# Patient Record
Sex: FEMALE | Race: WHITE | Employment: FULL TIME | URBAN - METROPOLITAN AREA
[De-identification: names, ages, dates, MRNs, and addresses within clinical notes are randomized per-mention and may not be internally consistent; named-entity substitution may affect disease eponyms.]

---

## 2020-01-13 ENCOUNTER — TELEPHONE (OUTPATIENT)
Dept: PULMONOLOGY | Age: 55
End: 2020-01-13

## 2020-01-22 ENCOUNTER — HOSPITAL ENCOUNTER (OUTPATIENT)
Age: 55
Discharge: HOME | End: 2020-01-22
Payer: COMMERCIAL

## 2020-01-22 ENCOUNTER — TELEPHONE (OUTPATIENT)
Dept: PULMONOLOGY | Age: 55
End: 2020-01-22

## 2020-01-22 ENCOUNTER — HOSPITAL ENCOUNTER (OUTPATIENT)
Dept: CT IMAGING | Age: 55
Discharge: HOME | End: 2020-01-22
Payer: COMMERCIAL

## 2020-01-22 ENCOUNTER — OFFICE VISIT (OUTPATIENT)
Dept: PULMONOLOGY | Age: 55
End: 2020-01-22
Payer: COMMERCIAL

## 2020-01-22 VITALS
RESPIRATION RATE: 16 BRPM | WEIGHT: 167.6 LBS | DIASTOLIC BLOOD PRESSURE: 60 MMHG | BODY MASS INDEX: 27.92 KG/M2 | HEART RATE: 86 BPM | OXYGEN SATURATION: 97 % | HEIGHT: 65 IN | TEMPERATURE: 97.8 F | SYSTOLIC BLOOD PRESSURE: 106 MMHG

## 2020-01-22 PROCEDURE — 99244 OFF/OP CNSLTJ NEW/EST MOD 40: CPT | Performed by: INTERNAL MEDICINE

## 2020-01-22 PROCEDURE — 71250 CT THORAX DX C-: CPT

## 2020-01-22 PROCEDURE — G8427 DOCREV CUR MEDS BY ELIG CLIN: HCPCS | Performed by: INTERNAL MEDICINE

## 2020-01-22 PROCEDURE — G8484 FLU IMMUNIZE NO ADMIN: HCPCS | Performed by: INTERNAL MEDICINE

## 2020-01-22 PROCEDURE — G8419 CALC BMI OUT NRM PARAM NOF/U: HCPCS | Performed by: INTERNAL MEDICINE

## 2020-01-22 PROCEDURE — 83520 IMMUNOASSAY QUANT NOS NONAB: CPT

## 2020-01-22 RX ORDER — BUPROPION HYDROCHLORIDE 75 MG/1
150 TABLET ORAL DAILY
Status: ON HOLD | COMMUNITY
End: 2021-06-30 | Stop reason: CLARIF

## 2020-01-22 RX ORDER — ALBUTEROL SULFATE 90 UG/1
2 AEROSOL, METERED RESPIRATORY (INHALATION) EVERY 6 HOURS PRN
Qty: 1 INHALER | Refills: 6 | Status: SHIPPED | OUTPATIENT
Start: 2020-01-22 | End: 2020-08-25 | Stop reason: SDUPTHER

## 2020-01-22 NOTE — COMMUNICATION BODY
Assessment:       · RLL 1.2 cm lung nodule on CT 1/22/2020. DDx granuloma and malignancy    · Cystic lung disease.  DDx [facility] and [facility]  · Chronic Cough-probable underlying COPD  · 35-pack-year history of smoking-quit 2 weeks ago      Plan:       · PFTs and 6MW  · PET scan-resection evaluation if nodule is hypermetabolic with negative mediastinum  · Check vascular endothelial growth factor-D  · Continue albuterol 2 puffs Q4-6 hrs PRN  · Advised to continue with smoking cessation

## 2020-01-22 NOTE — TELEPHONE ENCOUNTER
Pt is scheduled for PET ct on 1/31/2020. F/u is 2/4/2020.    1/22/2020    Assessment:       · RLL 1.2 cm lung nodule on CT 1/22/2020. DDx granuloma and malignancy    · Cystic lung disease.  DDx [facility] and [facility]  · Chronic Cough-probable underlying COPD  · 35-pack-year history of smoking-quit 2 weeks ago      Plan:       · PFTs and 6MW  · PET scan-resection evaluation if nodule is hypermetabolic with negative mediastinum  · Check vascular endothelial growth factor-D  · Continue albuterol 2 puffs Q4-6 hrs PRN  · Advised to continue with smoking cessation

## 2020-01-22 NOTE — PROGRESS NOTES
[facility]                                                                    CHIEF COMPLAINT: Pulmonary nodules    Consulting provider: JACKSON Donohue - CNP      HPI:   Patient was seen jan 5 for bronchitis and had CXR that showed RLL nodule. I did obtain CT scan, CT done 1/22/2020 imaging reviewed by me and showed small right lower lobe 1.2 cm nodule. Associated with multiple cysts in the lung bilaterally. Not sure what makes better or worse. No history of lung nodules. No weight loss. Smoker for 35 years 1 ppd. No history of PNA. No IBD. Chronic cough for few years. Little cough. Denies any SOB or wheezes. Not needing to use her Albuterol. Old records were reviewed and summarized by me. Past Medical History:   Diagnosis Date    Anemia        Past Surgical History:        Procedure Laterality Date    HERNIA REPAIR         Allergies:  is allergic to codeine; percocet [oxycodone-acetaminophen]; and vicodin [hydrocodone-acetaminophen]. Social History:    TOBACCO:   reports that she quit smoking about 3 weeks ago. Her smoking use included cigarettes. She has a 35.00 pack-year smoking history. She does not have any smokeless tobacco history on file. ETOH:   reports current alcohol use.       Family History:   No lung cancer       Current Medications:    Current Outpatient Medications:     buPROPion (WELLBUTRIN) 75 MG tablet, Take 75 mg by mouth daily, Disp: , Rfl:       REVIEW OF SYSTEMS:  Constitutional: Negative for fever  HENT: Negative for sore throat  Eyes: Negative for redness   Respiratory: Negative for dyspnea, cough  Cardiovascular: Negative for chest pain  Gastrointestinal: Negative for vomiting, diarrhea   Genitourinary: Negative for hematuria   Musculoskeletal: Negative for arthralgias   Skin: Negative for rash  Neurological: Negative for syncope  Hematological: Negative for adenopathy  Psychiatric/Behavorial: negative mediastinum  · Check vascular endothelial growth factor-D  · Continue albuterol 2 puffs Q4-6 hrs PRN  · Advised to continue with smoking cessation

## 2020-01-27 ENCOUNTER — HOSPITAL ENCOUNTER (OUTPATIENT)
Dept: PULMONOLOGY | Age: 55
Discharge: HOME | End: 2020-01-27
Payer: COMMERCIAL

## 2020-01-27 LAB
DLCO %PRED: 74 %
DLCO PRED: NORMAL
DLCO/VA %PRED: NORMAL
DLCO/VA PRED: NORMAL
DLCO/VA: NORMAL
DLCO: NORMAL
EXPIRATORY TIME-POST: NORMAL
EXPIRATORY TIME: NORMAL
FEF 25-75% %CHNG: NORMAL
FEF 25-75% %PRED-POST: NORMAL
FEF 25-75% %PRED-PRE: NORMAL
FEF 25-75% PRED: NORMAL
FEF 25-75%-POST: NORMAL
FEF 25-75%-PRE: NORMAL
FEV1 %PRED-POST: 95 %
FEV1 %PRED-PRE: 90 %
FEV1 PRED: NORMAL
FEV1-POST: NORMAL
FEV1-PRE: NORMAL
FEV1/FVC %PRED-POST: NORMAL
FEV1/FVC %PRED-PRE: NORMAL
FEV1/FVC PRED: NORMAL
FEV1/FVC-POST: 70 %
FEV1/FVC-PRE: 69 %
FVC %PRED-POST: NORMAL
FVC %PRED-PRE: NORMAL
FVC PRED: NORMAL
FVC-POST: NORMAL
FVC-PRE: NORMAL
GAW %PRED: NORMAL
GAW PRED: NORMAL
GAW: NORMAL
IC %PRED: NORMAL
IC PRED: NORMAL
IC: NORMAL
MEP: NORMAL
MIP: NORMAL
MVV %PRED-PRE: NORMAL
MVV PRED: NORMAL
MVV-PRE: NORMAL
PEF %PRED-POST: NORMAL
PEF %PRED-PRE: NORMAL
PEF PRED: NORMAL
PEF%CHNG: NORMAL
PEF-POST: NORMAL
PEF-PRE: NORMAL
RAW %PRED: NORMAL
RAW PRED: NORMAL
RAW: NORMAL
RV %PRED: NORMAL
RV PRED: NORMAL
RV: NORMAL
SVC %PRED: NORMAL
SVC PRED: NORMAL
SVC: NORMAL
TLC %PRED: 105 %
TLC PRED: NORMAL
TLC: NORMAL
VA %PRED: NORMAL
VA PRED: NORMAL
VA: NORMAL
VTG %PRED: NORMAL
VTG PRED: NORMAL
VTG: NORMAL

## 2020-01-27 PROCEDURE — 94060 EVALUATION OF WHEEZING: CPT

## 2020-01-27 PROCEDURE — 6360000002 HC RX W HCPCS: Performed by: INTERNAL MEDICINE

## 2020-01-27 PROCEDURE — 94618 PULMONARY STRESS TESTING: CPT

## 2020-01-27 PROCEDURE — 94729 DIFFUSING CAPACITY: CPT

## 2020-01-27 PROCEDURE — 94640 AIRWAY INHALATION TREATMENT: CPT

## 2020-01-27 PROCEDURE — 94726 PLETHYSMOGRAPHY LUNG VOLUMES: CPT

## 2020-01-27 RX ORDER — ALBUTEROL SULFATE 2.5 MG/3ML
2.5 SOLUTION RESPIRATORY (INHALATION) ONCE
Status: COMPLETED | OUTPATIENT
Start: 2020-01-27 | End: 2020-01-27

## 2020-01-27 RX ADMIN — ALBUTEROL SULFATE 2.5 MG: 2.5 SOLUTION RESPIRATORY (INHALATION) at 08:55

## 2020-01-27 ASSESSMENT — PULMONARY FUNCTION TESTS
FEV1/FVC_PRE: 69
FEV1_PERCENT_PREDICTED_PRE: 90
FEV1_PERCENT_PREDICTED_POST: 95
FEV1/FVC_POST: 70

## 2020-01-29 LAB — MISCELLANEOUS LAB TEST ORDER: NORMAL

## 2020-01-31 ENCOUNTER — HOSPITAL ENCOUNTER (OUTPATIENT)
Dept: PET IMAGING | Age: 55
Discharge: HOME | End: 2020-01-31
Payer: COMMERCIAL

## 2020-01-31 VITALS — BODY MASS INDEX: 27.82 KG/M2 | WEIGHT: 167 LBS | HEIGHT: 65 IN

## 2020-01-31 PROCEDURE — 3430000000 HC RX DIAGNOSTIC RADIOPHARMACEUTICAL: Performed by: INTERNAL MEDICINE

## 2020-01-31 PROCEDURE — A9552 F18 FDG: HCPCS | Performed by: INTERNAL MEDICINE

## 2020-01-31 PROCEDURE — 78815 PET IMAGE W/CT SKULL-THIGH: CPT

## 2020-01-31 RX ORDER — FLUDEOXYGLUCOSE F 18 200 MCI/ML
16.9 INJECTION, SOLUTION INTRAVENOUS
Status: COMPLETED | OUTPATIENT
Start: 2020-01-31 | End: 2020-01-31

## 2020-01-31 RX ADMIN — FLUDEOXYGLUCOSE F 18 16.9 MILLICURIE: 200 INJECTION, SOLUTION INTRAVENOUS at 10:30

## 2020-02-04 ENCOUNTER — OFFICE VISIT (OUTPATIENT)
Dept: PULMONOLOGY | Age: 55
End: 2020-02-04
Payer: COMMERCIAL

## 2020-02-04 ENCOUNTER — TELEPHONE (OUTPATIENT)
Dept: PULMONOLOGY | Age: 55
End: 2020-02-04

## 2020-02-04 ENCOUNTER — HOSPITAL ENCOUNTER (EMERGENCY)
Age: 55
Discharge: HOME | End: 2020-02-04
Attending: EMERGENCY MEDICINE
Payer: COMMERCIAL

## 2020-02-04 ENCOUNTER — APPOINTMENT (OUTPATIENT)
Dept: GENERAL RADIOLOGY | Age: 55
End: 2020-02-04
Payer: COMMERCIAL

## 2020-02-04 VITALS — OXYGEN SATURATION: 98 % | RESPIRATION RATE: 16 BRPM | HEIGHT: 65 IN | BODY MASS INDEX: 27.82 KG/M2 | WEIGHT: 167 LBS

## 2020-02-04 VITALS
BODY MASS INDEX: 27.82 KG/M2 | WEIGHT: 167 LBS | HEART RATE: 78 BPM | RESPIRATION RATE: 10 BRPM | OXYGEN SATURATION: 97 % | HEIGHT: 65 IN | DIASTOLIC BLOOD PRESSURE: 73 MMHG | SYSTOLIC BLOOD PRESSURE: 109 MMHG | TEMPERATURE: 97.8 F

## 2020-02-04 LAB
A/G RATIO: 1.3 (ref 1.1–2.2)
ALBUMIN SERPL-MCNC: 4.4 G/DL (ref 3.4–5)
ALP BLD-CCNC: 49 U/L (ref 40–129)
ALT SERPL-CCNC: 18 U/L (ref 10–40)
ANION GAP SERPL CALCULATED.3IONS-SCNC: 19 MMOL/L (ref 3–16)
AST SERPL-CCNC: 18 U/L (ref 15–37)
BASOPHILS ABSOLUTE: 0.1 K/UL (ref 0–0.2)
BASOPHILS RELATIVE PERCENT: 0.7 %
BILIRUB SERPL-MCNC: <0.2 MG/DL (ref 0–1)
BUN BLDV-MCNC: 10 MG/DL (ref 7–20)
CALCIUM SERPL-MCNC: 10.1 MG/DL (ref 8.3–10.6)
CHLORIDE BLD-SCNC: 99 MMOL/L (ref 99–110)
CO2: 20 MMOL/L (ref 21–32)
CREAT SERPL-MCNC: 0.8 MG/DL (ref 0.6–1.1)
D DIMER: <200 NG/ML DDU (ref 0–229)
EKG ATRIAL RATE: 78 BPM
EKG DIAGNOSIS: NORMAL
EKG P AXIS: 54 DEGREES
EKG P-R INTERVAL: 132 MS
EKG Q-T INTERVAL: 404 MS
EKG QRS DURATION: 76 MS
EKG QTC CALCULATION (BAZETT): 460 MS
EKG R AXIS: 40 DEGREES
EKG T AXIS: 52 DEGREES
EKG VENTRICULAR RATE: 78 BPM
EOSINOPHILS ABSOLUTE: 0.1 K/UL (ref 0–0.6)
EOSINOPHILS RELATIVE PERCENT: 1.5 %
GFR AFRICAN AMERICAN: >60
GFR NON-AFRICAN AMERICAN: >60
GLOBULIN: 3.3 G/DL
GLUCOSE BLD-MCNC: 105 MG/DL (ref 70–99)
HCT VFR BLD CALC: 44.5 % (ref 36–48)
HEMOGLOBIN: 14.6 G/DL (ref 12–16)
LYMPHOCYTES ABSOLUTE: 3.2 K/UL (ref 1–5.1)
LYMPHOCYTES RELATIVE PERCENT: 45.9 %
MCH RBC QN AUTO: 29.4 PG (ref 26–34)
MCHC RBC AUTO-ENTMCNC: 32.8 G/DL (ref 31–36)
MCV RBC AUTO: 89.8 FL (ref 80–100)
MONOCYTES ABSOLUTE: 0.5 K/UL (ref 0–1.3)
MONOCYTES RELATIVE PERCENT: 7.2 %
NEUTROPHILS ABSOLUTE: 3.1 K/UL (ref 1.7–7.7)
NEUTROPHILS RELATIVE PERCENT: 44.7 %
PDW BLD-RTO: 14.3 % (ref 12.4–15.4)
PLATELET # BLD: 304 K/UL (ref 135–450)
PMV BLD AUTO: 8.1 FL (ref 5–10.5)
POTASSIUM REFLEX MAGNESIUM: 4 MMOL/L (ref 3.5–5.1)
RBC # BLD: 4.95 M/UL (ref 4–5.2)
SODIUM BLD-SCNC: 138 MMOL/L (ref 136–145)
TOTAL PROTEIN: 7.7 G/DL (ref 6.4–8.2)
TROPONIN: <0.01 NG/ML
TROPONIN: <0.01 NG/ML
WBC # BLD: 7 K/UL (ref 4–11)

## 2020-02-04 PROCEDURE — 96374 THER/PROPH/DIAG INJ IV PUSH: CPT

## 2020-02-04 PROCEDURE — G8427 DOCREV CUR MEDS BY ELIG CLIN: HCPCS | Performed by: INTERNAL MEDICINE

## 2020-02-04 PROCEDURE — 93005 ELECTROCARDIOGRAM TRACING: CPT | Performed by: EMERGENCY MEDICINE

## 2020-02-04 PROCEDURE — G8484 FLU IMMUNIZE NO ADMIN: HCPCS | Performed by: INTERNAL MEDICINE

## 2020-02-04 PROCEDURE — 6370000000 HC RX 637 (ALT 250 FOR IP): Performed by: EMERGENCY MEDICINE

## 2020-02-04 PROCEDURE — 1036F TOBACCO NON-USER: CPT | Performed by: INTERNAL MEDICINE

## 2020-02-04 PROCEDURE — 80053 COMPREHEN METABOLIC PANEL: CPT

## 2020-02-04 PROCEDURE — 84484 ASSAY OF TROPONIN QUANT: CPT

## 2020-02-04 PROCEDURE — 3017F COLORECTAL CA SCREEN DOC REV: CPT | Performed by: INTERNAL MEDICINE

## 2020-02-04 PROCEDURE — 85025 COMPLETE CBC W/AUTO DIFF WBC: CPT

## 2020-02-04 PROCEDURE — 93010 ELECTROCARDIOGRAM REPORT: CPT | Performed by: INTERNAL MEDICINE

## 2020-02-04 PROCEDURE — 99285 EMERGENCY DEPT VISIT HI MDM: CPT

## 2020-02-04 PROCEDURE — 6360000002 HC RX W HCPCS: Performed by: EMERGENCY MEDICINE

## 2020-02-04 PROCEDURE — G8419 CALC BMI OUT NRM PARAM NOF/U: HCPCS | Performed by: INTERNAL MEDICINE

## 2020-02-04 PROCEDURE — 85379 FIBRIN DEGRADATION QUANT: CPT

## 2020-02-04 PROCEDURE — 99214 OFFICE O/P EST MOD 30 MIN: CPT | Performed by: INTERNAL MEDICINE

## 2020-02-04 PROCEDURE — 71046 X-RAY EXAM CHEST 2 VIEWS: CPT

## 2020-02-04 RX ORDER — LORAZEPAM 2 MG/ML
1 INJECTION INTRAMUSCULAR ONCE
Status: COMPLETED | OUTPATIENT
Start: 2020-02-04 | End: 2020-02-04

## 2020-02-04 RX ORDER — PANTOPRAZOLE SODIUM 40 MG/1
40 TABLET, DELAYED RELEASE ORAL DAILY
COMMUNITY
End: 2020-06-15 | Stop reason: ALTCHOICE

## 2020-02-04 RX ORDER — ASPIRIN 81 MG/1
324 TABLET, CHEWABLE ORAL ONCE
Status: COMPLETED | OUTPATIENT
Start: 2020-02-04 | End: 2020-02-04

## 2020-02-04 RX ORDER — CETIRIZINE HYDROCHLORIDE 10 MG/1
10 TABLET ORAL DAILY
COMMUNITY
End: 2021-08-19 | Stop reason: ALTCHOICE

## 2020-02-04 RX ADMIN — LORAZEPAM 1 MG: 2 INJECTION INTRAMUSCULAR; INTRAVENOUS at 14:34

## 2020-02-04 RX ADMIN — ASPIRIN 324 MG: 81 TABLET, CHEWABLE ORAL at 14:34

## 2020-02-04 ASSESSMENT — PAIN DESCRIPTION - PAIN TYPE: TYPE: ACUTE PAIN

## 2020-02-04 ASSESSMENT — PAIN SCALES - GENERAL
PAINLEVEL_OUTOF10: 5
PAINLEVEL_OUTOF10: 3

## 2020-02-04 ASSESSMENT — PAIN DESCRIPTION - FREQUENCY: FREQUENCY: CONTINUOUS

## 2020-02-04 ASSESSMENT — PAIN DESCRIPTION - DESCRIPTORS: DESCRIPTORS: ACHING;SHARP

## 2020-02-04 ASSESSMENT — PAIN DESCRIPTION - LOCATION: LOCATION: CHEST

## 2020-02-04 NOTE — PROGRESS NOTES
[facility]                                                                    CHIEF COMPLAINT: Follow-up PET scan        HPI:   PET scan reviewed by me and noted below. Results were discussed with patient. Patient started with chest cramp this a.m. then while driving started having tingling numbness on the left side. Tachypneic tachycardic in office saying I am having a panic attack. Patient was placed on a wheelchair and taken down to ED. From prior visit:   Patient was seen jan 5 for bronchitis and had CXR that showed RLL nodule. I did obtain CT scan, CT done 1/22/2020 imaging reviewed by me and showed small right lower lobe 1.2 cm nodule. Associated with multiple cysts in the lung bilaterally. Not sure what makes better or worse. No history of lung nodules. No weight loss. Smoker for 35 years 1 ppd. No history of PNA. No IBD. Chronic cough for few years. Little cough. Denies any SOB or wheezes. Not needing to use her Albuterol. Old records were reviewed and summarized by me. Past Medical History:   Diagnosis Date    Anemia     Hiatal hernia     Migraines        Past Surgical History:        Procedure Laterality Date    HERNIA REPAIR         Allergies:  is allergic to codeine; percocet [oxycodone-acetaminophen]; and vicodin [hydrocodone-acetaminophen]. Social History:    TOBACCO:   reports that she quit smoking about 4 weeks ago. Her smoking use included cigarettes. She has a 35.00 pack-year smoking history. She has never used smokeless tobacco.  ETOH:   reports current alcohol use.       Family History:   No lung cancer       Current Medications:    Current Outpatient Medications:     pantoprazole (PROTONIX) 40 MG tablet, Take 40 mg by mouth daily, Disp: , Rfl:     cetirizine (ZYRTEC) 10 MG tablet, Take 10 mg by mouth daily, Disp: , Rfl:     Acetaminophen (TYLENOL EXTRA STRENGTH PO), Take by mouth, Disp: , right lower lobe nodule, SUV 2.95    Vascular endothelial G factor 49    Assessment:       · Left-sided tingling, numbness, and weakness -rule out acute CVA  · RLL 1.2 cm lung nodule on CT 1/22/2020. Mild activity on PET scan 1/31/2020 with SUV of 2.95. Granuloma versus malignancy  · Cystic lung disease. DDx [facility] and [facility].  Vascular endothelial growth factor D  · Chronic Cough-probable underlying COPD  · Mild COPD  · 35-pack-year history of smoking-quit 2 weeks ago      Plan:       · Patient was emergently wheeled down to ED for acute CVA evaluation   · Discussed with ED physician  · Options of Bx, resection and watchful waiting were discussed with patient/family. Patient is in favor of resection.   We will make a referral.  · Continue albuterol 2 puffs Q4-6 hrs PRN  · Advised to continue with smoking cessation

## 2020-02-04 NOTE — TELEPHONE ENCOUNTER
Pt needs referred to Dr Merly Jimenez. F/u with Dr Charles Schwartz needed also as pt was taken to ER during visit    2/4/2020    Assessment:       · Left-sided tingling, numbness, and weakness -rule out acute CVA  · RLL 1.2 cm lung nodule on CT 1/22/2020. Mild activity on PET scan 1/31/2020 with SUV of 2.95. Granuloma versus malignancy  · Cystic lung disease. DDx [facility] and [facility].  Vascular endothelial growth factor D  · Chronic Cough-probable underlying COPD  · Mild COPD  · 35-pack-year history of smoking-quit 2 weeks ago      Plan:       · Patient was emergently wheeled down to ED for acute CVA evaluation   · Discussed with ED physician  · Options of Bx, resection and watchful waiting were discussed with patient/family. Patient is in favor of resection.   We will make a referral.  · Continue albuterol 2 puffs Q4-6 hrs PRN  · Advised to continue with smoking cessation

## 2020-02-04 NOTE — ED PROVIDER NOTES
1/26/2020to be communicated to a licensed caregiver. Pet Ct Skull Base To Mid Thigh    Result Date: 1/31/2020  EXAMINATION: WHOLE BODY PET/CT 1/31/2020 TECHNIQUE: Following IV injection of 16.9 mCi of F 18 -FDG, PET  tumor imaging was acquired from the base of the skull to the mid thighs. Computed tomography was used for purposes of attenuation correction and anatomic localization. Fusion imaging was utilized for interpretation. Uptake time 55 mins. Glucose level 95 mg/dl. COMPARISON: Chest CT 01/22/2020 HISTORY: ORDERING SYSTEM PROVIDED HISTORY: Right lower lobe pulmonary nodule TECHNOLOGIST PROVIDED HISTORY: Reason for exam:->see list Is the patient pregnant?->No FINDINGS: No hypermetabolic adenopathy within the neck. There is redemonstration of the previously described pulmonary nodule in the right lower lobe. This is smoothly marginated. This measures 1.2 x 1.1 cm. .. This is hypermetabolic, maximum SUV measuring 3.05 No hypermetabolic pulmonary nodules on the left. No hypermetabolic mediastinal adenopathy. Postsurgical changes seen at the GE junction. No hypermetabolic adrenal nodules are noted. Mild stool load seen in the colon. No free fluid in pelvis. No pelvic adenopathy. Spurring is seen in the spine     Right lower lobe pulmonary nodule seen on recent CT scan is mildly hypermetabolic. This is low-density but not in a typical location for a bronchogenic cyst.  Early finding of lung carcinoma should be considered given the underlying emphysema         ED course: Patient presenting for evaluation of chest discomfort as well as paresthesias in her extremities. She appears somewhat anxious which certainly may be contributory towards her symptoms. Pain is somewhat pleuritic and I did obtain a d-dimer as I felt that she was low pretest probability for PE and this was negative. Initial troponin and EKG unremarkable with negative troponin. Will repeat troponin at 3-hour yoel.   I do not feel that

## 2020-02-04 NOTE — ED PROVIDER NOTES
[address] ED  EMERGENCY DEPARTMENT ENCOUNTER        Pt Name: Sophie Aguirre  MRN: [id number]  [place] 1965  Date of evaluation: 2020  Provider: JACKSON Zuleta CNP  PCP: JACKSON Santo CNP    This patient was seen and evaluated by the attending physician Rabia Dill MD.      [facility]       Chief Complaint   Patient presents with    Chest Pain     pt c/o chest pain and left sided numbness that started around 1130 this am, pt states pain is dull but gets sharp when she breaths in, denies N/V        HISTORY OF PRESENT ILLNESS   (Location/Symptom, Timing/Onset, Context/Setting, Quality, Duration, Modifying Factors, Severity)  Note limiting factors. Sophie Aguirre is a 49 y.o. female who presents with multiple medical complaints. Patient states that she was at Dr. Madi Turcios office and was sent here for a work-up for chest pain. Patient states that this morning she was in the shower washing her legs when she developed pain on her right rib cage, shortness of breath, right shoulder blade pain, and right arm tingling. States that while she was in the pulmonologist's office to go over nodules on her lungs that were found on CT scan she began having the symptoms again. States that she has had panic attacks before and this does kind of feel like a panic attack, however, she states it still difficult to take a deep breath. Currently denies chest pain, numbness, or tingling to extremities. States she does have a history of smoking, however, she has not smoked in approximately 1 month. States that she does have a family history of heart disease and that her mother had quadruple bypass and her father  from CHF. Currently denies chest pain, fever, chills, nausea, vomiting, and diarrhea. Nursing Notes were all reviewed and agreed with or any disagreements were addressed in the HPI.     REVIEW OF SYSTEMS    (2-9 systems for level 4, 10 or more for level 5) Musculoskeletal: Normal range of motion and neck supple. Cardiovascular:      Rate and Rhythm: Normal rate and regular rhythm. Pulses:           Radial pulses are 2+ on the right side and 2+ on the left side. Heart sounds: Normal heart sounds. Pulmonary:      Effort: Pulmonary effort is normal. No respiratory distress. Breath sounds: Normal breath sounds. Abdominal:      General: Bowel sounds are normal.      Palpations: Abdomen is soft. Musculoskeletal: Normal range of motion. Skin:     General: Skin is warm and dry. Neurological:      Mental Status: She is alert and oriented to person, place, and time. Psychiatric:         Mood and Affect: Mood is anxious. Speech: Speech normal.         Behavior: Behavior normal.         DIAGNOSTIC RESULTS   LABS:    Labs Reviewed   COMPREHENSIVE METABOLIC PANEL W/ REFLEX TO MG FOR LOW K - Abnormal; Notable for the following components:       Result Value    CO2 20 (*)     Anion Gap 19 (*)     Glucose 105 (*)     All other components within normal limits    Narrative:     Performed at:  [place] [address],  [place], [facility]   Phone [phone]   TROPONIN    Narrative:     Performed at:  [address] [place] [address],  [place], [facility]   Phone [phone]   CBC WITH AUTO DIFFERENTIAL    Narrative:     Performed at:  [place] [address],  [place], [facility]   Phone [phone]   TROPONIN    Narrative:     Performed at:  [facility]) - [facility] [address],  [place], [facility]   Phone [phone]   D-DIMER, QUANTITATIVE    Narrative:     Performed at:  [facility]) [facility] [address],  [place], [facility]   Phone [phone]       All other labs were within normal range or not returned as of this dictation. EKG:  All EKG's are interpreted by the Emergency Department Physician in the absence of a cardiologist.  Please see their note for interpretation of EKG. RADIOLOGY:   Non-plain film images such as CT, Ultrasound and MRI are read by the radiologist. Plain radiographic images are visualized and preliminarily interpreted by the  ED Provider with the below findings:        Interpretation per the Radiologist below, if available at the time of this note:    XR CHEST STANDARD (2 VW)   Final Result   No acute process. Patient's known right lower lobe lung nodules again seen. Pet Ct Skull Base To Mid Thigh    Result Date: 1/31/2020  EXAMINATION: WHOLE BODY PET/CT 1/31/2020 TECHNIQUE: Following IV injection of 16.9 mCi of F 18 -FDG, PET  tumor imaging was acquired from the base of the skull to the mid thighs. Computed tomography was used for purposes of attenuation correction and anatomic localization. Fusion imaging was utilized for interpretation. Uptake time 55 mins. Glucose level 95 mg/dl. COMPARISON: Chest CT 01/22/2020 HISTORY: ORDERING SYSTEM PROVIDED HISTORY: Right lower lobe pulmonary nodule TECHNOLOGIST PROVIDED HISTORY: Reason for exam:->see list Is the patient pregnant?->No FINDINGS: No hypermetabolic adenopathy within the neck. There is redemonstration of the previously described pulmonary nodule in the right lower lobe. This is smoothly marginated. This measures 1.2 x 1.1 cm. .. This is hypermetabolic, maximum SUV measuring 9.59 No hypermetabolic pulmonary nodules on the left. No hypermetabolic mediastinal adenopathy. Postsurgical changes seen at the GE junction. No hypermetabolic adrenal nodules are noted. Mild stool load seen in the colon. No free fluid in pelvis. No pelvic adenopathy. Spurring is seen in the spine     Right lower lobe pulmonary nodule seen on recent CT scan is mildly hypermetabolic.   This is low-density but not in a typical location for a bronchogenic cyst.  Early finding of lung carcinoma should be considered given the underlying emphysema           PROCEDURES   Unless otherwise noted below, none     Procedures    CRITICAL CARE TIME   N/A    CONSULTS:  None      EMERGENCY DEPARTMENT COURSE and DIFFERENTIAL DIAGNOSIS/MDM:   Vitals:    Vitals:    02/04/20 1437 02/04/20 1531 02/04/20 1538 02/04/20 1703   BP: 123/76   105/62   Pulse: 79 80 89 80   Resp: 18 15 16 25   Temp:       TempSrc:       SpO2: 100% 96% 100% 100%   Weight:       Height:           Patient was given thefollowing medications:  Medications   LORazepam (ATIVAN) injection 1 mg (1 mg Intravenous Given 2/4/20 1434)   aspirin chewable tablet 324 mg (324 mg Oral Given 2/4/20 1434)     Patient is nontoxic, in no apparent distress, and appears anxious. EKG, chest x-ray, and lab work ordered. Patient given 1 mg Ativan and 324 mg of aspirin. CBC and CMP unremarkable, troponin negative. Second troponin to be drawn at 1700. D-dimer negative, chest x-ray negative for acute process. 1730 -second troponin negative. HEART SCORE:    History: +0 for low suspicion  EKG: +0 for normal EKG   Age: +1 for age 44-72 years  Risk factors (includes HLD, HTN, DM, tobacco use, obesity, and +FHx): +1 for 1-2 risk factors  Initial troponin: +0 for negative troponin    Heart score: 2. This falls under the following category: Score of 0-3, which indicates a very low risk for major adverse cardiac event and supports early discharge    1800 -patient reassessed at this time and updated that her work-up is negative. Patient instructed take Tylenol and/or Motrin for her pain, follow-up with Dr. Pati Goode, and return for worsening symptoms. We have discussed the diagnosis and risks, and we agree with discharging home to follow-up with their primary doctor. We also discussed returning to the Emergency Department immediately if new or worsening symptoms occur.  We have discussed the symptoms which are most concerning (e.g., bloody sputum, fever, worsening pain or shortness of

## 2020-02-10 ENCOUNTER — OFFICE VISIT (OUTPATIENT)
Dept: CARDIOTHORACIC SURGERY | Age: 55
End: 2020-02-10
Payer: COMMERCIAL

## 2020-02-10 VITALS
SYSTOLIC BLOOD PRESSURE: 132 MMHG | TEMPERATURE: 98.2 F | DIASTOLIC BLOOD PRESSURE: 92 MMHG | OXYGEN SATURATION: 98 % | HEART RATE: 89 BPM | HEIGHT: 65 IN | WEIGHT: 166 LBS | BODY MASS INDEX: 27.66 KG/M2

## 2020-02-10 PROCEDURE — G8419 CALC BMI OUT NRM PARAM NOF/U: HCPCS | Performed by: THORACIC SURGERY (CARDIOTHORACIC VASCULAR SURGERY)

## 2020-02-10 PROCEDURE — G8484 FLU IMMUNIZE NO ADMIN: HCPCS | Performed by: THORACIC SURGERY (CARDIOTHORACIC VASCULAR SURGERY)

## 2020-02-10 PROCEDURE — 99202 OFFICE O/P NEW SF 15 MIN: CPT | Performed by: THORACIC SURGERY (CARDIOTHORACIC VASCULAR SURGERY)

## 2020-02-10 PROCEDURE — 1036F TOBACCO NON-USER: CPT | Performed by: THORACIC SURGERY (CARDIOTHORACIC VASCULAR SURGERY)

## 2020-02-10 PROCEDURE — 3017F COLORECTAL CA SCREEN DOC REV: CPT | Performed by: THORACIC SURGERY (CARDIOTHORACIC VASCULAR SURGERY)

## 2020-02-10 PROCEDURE — G8427 DOCREV CUR MEDS BY ELIG CLIN: HCPCS | Performed by: THORACIC SURGERY (CARDIOTHORACIC VASCULAR SURGERY)

## 2020-02-10 RX ORDER — METHYLPREDNISOLONE 4 MG/1
4 TABLET ORAL SEE ADMIN INSTRUCTIONS
COMMUNITY
End: 2020-06-15 | Stop reason: ALTCHOICE

## 2020-02-10 RX ORDER — CYCLOBENZAPRINE HCL 5 MG
5 TABLET ORAL 3 TIMES DAILY PRN
COMMUNITY
End: 2020-08-25

## 2020-02-10 NOTE — PROGRESS NOTES
Consultation H&P        Date of Consultation:  2/10/2020    PCP:  JACKSON Kamara - CNP      Chief Complaint: RLL pulmonary nodile    History of Present Illness: We are asked to see this patient in consultation by Dr. Theodore Castillo regarding  Nahum Morales is a 49 y.o. female who asymptomatic incidental finding no previous CT scan to review or compare but is low uptake right lower lobe     Past Medical History:  Past Medical History:   Diagnosis Date    Anemia     Hiatal hernia     Migraines        Past Surgical History:  Past Surgical History:   Procedure Laterality Date    HERNIA REPAIR         Home Medications:   Prior to Admission medications    Medication Sig Start Date End Date Taking? Authorizing Provider   methylPREDNISolone (MEDROL DOSEPACK) 4 MG tablet Take 4 mg by mouth See Admin Instructions Take by mouth. Yes Historical Provider, MD   cyclobenzaprine (FLEXERIL) 5 MG tablet Take 5 mg by mouth 3 times daily as needed for Muscle spasms   Yes Historical Provider, MD   pantoprazole (PROTONIX) 40 MG tablet Take 40 mg by mouth daily   Yes Historical Provider, MD   cetirizine (ZYRTEC) 10 MG tablet Take 10 mg by mouth daily   Yes Historical Provider, MD   Acetaminophen (TYLENOL EXTRA STRENGTH PO) Take by mouth   Yes Historical Provider, MD   buPROPion (WELLBUTRIN) 75 MG tablet Take 150 mg by mouth daily    Yes Historical Provider, MD   albuterol sulfate HFA (PROAIR HFA) 108 (90 Base) MCG/ACT inhaler Inhale 2 puffs into the lungs every 6 hours as needed for Wheezing or Shortness of Breath 1/22/20  Yes Shruthi Kay MD        Facility Administered Medications: Allergies:     Allergies   Allergen Reactions    Codeine Hives and Itching    Percocet [Oxycodone-Acetaminophen] Nausea And Vomiting and Rash    Vicodin [Hydrocodone-Acetaminophen] Rash        Social History:       Social History     Socioeconomic History    Marital status:      Spouse name: Not on file    Number of children: Not on file    Years of education: Not on file    Highest education level: Not on file   Occupational History    Not on file   Social Needs    Financial resource strain: Not on file    Food insecurity:     Worry: Not on file     Inability: Not on file    Transportation needs:     Medical: Not on file     Non-medical: Not on file   Tobacco Use    Smoking status: Former Smoker     Packs/day: 1.00     Years: 35.00     Pack years: 35.00     Types: Cigarettes     Last attempt to quit: 2020     Years since quittin.1    Smokeless tobacco: Never Used   Substance and Sexual Activity    Alcohol use: Yes     Comment: rarely    Drug use: No    Sexual activity: Not on file   Lifestyle    Physical activity:     Days per week: Not on file     Minutes per session: Not on file    Stress: Not on file   Relationships    Social connections:     Talks on phone: Not on file     Gets together: Not on file     Attends Yarsani service: Not on file     Active member of club or organization: Not on file     Attends meetings of clubs or organizations: Not on file     Relationship status: Not on file    Intimate partner violence:     Fear of current or ex partner: Not on file     Emotionally abused: Not on file     Physically abused: Not on file     Forced sexual activity: Not on file   Other Topics Concern    Not on file   Social History Narrative    Not on file       Family History:    History reviewed. No pertinent family history. Referred by Dr. Lisy Barney  Review of Systems:  Constitutional:  No night sweats, headaches, weight loss. Eyes:  No glaucoma, cataracts. ENMT:  No nosebleeds, deviated septum. Cardiac:  No arrhythmias. No cardiac history. Vascular:  No claudication, varicosities. GI:  No PUD, heartburn. :  No kidney stones, frequent UTIs  Musculoskeletal:  No arthritis, gout. Respiratory:  + mild SOB, + cough, emphysema, asthma. Integumentary:  No dermatitis, itching, rash.   Neurological:  + migraines. No stroke, TIAs, seizures. Psychiatric:  No depression, + anxiety. Endocrine: No diabetes, thyroid issues. Hematologic:  No bleeding, easy bruising. Immunologic:  No known cancer, + steroid therapies- medrol dose pack. Physical Examination:    BP (!) 132/92 (Site: Left Upper Arm, Position: Sitting, Cuff Size: Medium Adult)   Pulse 89   Temp 98.2 °F (36.8 °C) (Oral)   Ht 5' 5\" (1.651 m)   Wt 166 lb (75.3 kg)   SpO2 98%   BMI 27.62 kg/m²      BP RUE:  BP LUE:   Admission Weight: 166 lb (75.3 kg)       General appearance: NAD, well nourished  Eyes: anicteric, PERRLA  ENMT: no scars or lesions, no nasal deformity, normal dentition, no cyanosis of oral mucosa  Neck: no masses, no thyroid enlargement, no JVD. Respiratory: effort is unlabored, symmetric, no crackles, wheezes or rubs. No palpable/percussable abnormalities. Cardiovascular: regular, no murmur. PMI normal, no thrill. No carotid bruits. No edema or varicosities. Abdominal aorta cannot be appreciated given body habitus. GI: abdomen soft, nondistended, no organomegaly. No masses. Lymphatic: no cervical/supraclavicular adenopathy  Musculoskeletal: strength and tone normal. Full ROM. No scoliosis. Extremities: warm and pink. No clubbing or petechiae. Skin: no dermatitis or ulceration. No nodularity or induration. Neuro: CN grossly intact. Sensation and motor function grossly intact. Psychiatric: oriented, appropriate mood/affect. MEDICAL DECISION MAKING/TESTING  Studies personally reviewed. Echo:     CXR:     CT: Reviewed results with patient. PET/CT:Reviewed results with patient. PFT      Labs:   CBC: No results for input(s): WBC, HGB, HCT, MCV, PLT in the last 72 hours. BMP: No results for input(s): NA, K, CL, CO2, PHOS, BUN, CREATININE, CALCIUM, MG in the last 72 hours. Cardiac Enzymes: No results for input(s): CKTOTAL, CKMB, CKMBINDEX, TROPONINI in the last 72 hours.   PT/INR: No results for input(s): PROTIME, INR in the last 72 hours. APTT: No results for input(s): APTT in the last 72 hours. Liver Profile:  Lab Results   Component Value Date    AST 18 02/04/2020    ALT 18 02/04/2020    BILITOT <0.2 02/04/2020    ALKPHOS 49 02/04/2020   No results found for: CHOL, HDL, TRIG  UA:   Lab Results   Component Value Date    PHUR 6.5 05/14/2012    WBCUA 3-5 05/14/2012    MUCUS Present 05/14/2012    BACTERIA Many 05/14/2012    SPECGRAV 1.020 05/14/2012    LEUKOCYTESUR Negative 05/14/2012    UROBILINOGEN 2.0 05/14/2012    BILIRUBINUR Negative 05/14/2012    BLOODU Negative 05/14/2012    GLUCOSEU Negative 05/14/2012       History obtained: chart, pt    Risk factors: Reviewed with patient.      Diagnosis:    Diagnosis Orders   1. Right lower lobe pulmonary nodule         Plan: As discussed with the patient her option would be between CT-guided biopsy followed by resection of its cancer orcT scan in 3 months with follow-up  Patient's agreed to proceed with a CT scan follow-up Case was discussed with pulmonary over the phone. I Orlando Harvey MA am scribing for and in the presence of Callie Henriquez MD on this date of 02/10/20 at 11:14 AM  Norris Mahajan MD personally performed the services described in this documentation as scribed by the Certified Medical Assistant Orlando Harvey in my presence and it is both accurate and complete.   He Wyatt MD [facility]

## 2020-02-10 NOTE — PROGRESS NOTES
Referred by Dr. Regis Hernández  Review of Systems:  Constitutional:  No night sweats, headaches, weight loss. Eyes:  No glaucoma, cataracts. ENMT:  No nosebleeds, deviated septum. Cardiac:  No arrhythmias. No cardiac history. Vascular:  No claudication, varicosities. GI:  No PUD, heartburn. :  No kidney stones, frequent UTIs  Musculoskeletal:  No arthritis, gout. Respiratory:  + mild SOB, + cough, emphysema, asthma. Integumentary:  No dermatitis, itching, rash. Neurological:  + migraines. No stroke, TIAs, seizures. Psychiatric:  No depression, + anxiety. Endocrine: No diabetes, thyroid issues. Hematologic:  No bleeding, easy bruising. Immunologic:  No known cancer, + steroid therapies- medrol dose pack.

## 2020-05-15 ENCOUNTER — HOSPITAL ENCOUNTER (OUTPATIENT)
Dept: CT IMAGING | Age: 55
Discharge: HOME | End: 2020-05-15
Payer: COMMERCIAL

## 2020-05-15 PROCEDURE — 71250 CT THORAX DX C-: CPT

## 2020-05-20 ENCOUNTER — TELEPHONE (OUTPATIENT)
Dept: PULMONOLOGY | Age: 55
End: 2020-05-20

## 2020-06-04 ENCOUNTER — TELEPHONE (OUTPATIENT)
Dept: CARDIOTHORACIC SURGERY | Age: 55
End: 2020-06-04

## 2020-06-08 ENCOUNTER — TELEPHONE (OUTPATIENT)
Dept: PULMONOLOGY | Age: 55
End: 2020-06-08

## 2020-06-15 ENCOUNTER — VIRTUAL VISIT (OUTPATIENT)
Dept: PULMONOLOGY | Age: 55
End: 2020-06-15
Payer: COMMERCIAL

## 2020-06-15 ENCOUNTER — HOSPITAL ENCOUNTER (OUTPATIENT)
Dept: ULTRASOUND IMAGING | Age: 55
Discharge: HOME | End: 2020-06-15
Payer: COMMERCIAL

## 2020-06-15 PROCEDURE — 99214 OFFICE O/P EST MOD 30 MIN: CPT | Performed by: INTERNAL MEDICINE

## 2020-06-15 PROCEDURE — 76536 US EXAM OF HEAD AND NECK: CPT

## 2020-06-15 RX ORDER — TRAZODONE HYDROCHLORIDE 50 MG/1
50 TABLET ORAL NIGHTLY PRN
COMMUNITY

## 2020-06-15 RX ORDER — BUSPIRONE HYDROCHLORIDE 10 MG/1
10 TABLET ORAL 2 TIMES DAILY
COMMUNITY
End: 2021-08-19 | Stop reason: ALTCHOICE

## 2020-06-15 RX ORDER — FLUTICASONE PROPIONATE 50 MCG
1 SPRAY, SUSPENSION (ML) NASAL DAILY
COMMUNITY
End: 2022-03-08

## 2020-06-15 RX ORDER — HYDROXYZINE HYDROCHLORIDE 25 MG/1
25 TABLET, FILM COATED ORAL 3 TIMES DAILY
COMMUNITY
End: 2021-08-19 | Stop reason: ALTCHOICE

## 2020-06-15 NOTE — PROGRESS NOTES
18.26 (74%) 6MW 1140 F LO2 97%     CT chest 1/22/2020  Mediastinum: Limited evaluation without IV contrast.  No mediastinal  adenopathy is appreciated. Central airways appear patent. Lungs/pleura: Mild scattered emphysematous changes are noted. Innumerable  small cysts are seen scattered in the lung parenchyma bilaterally. No  pleural effusion or focal intrapulmonary dense consolidative process. Within  the right lower lobe there is a noncalcified round pulmonary nodule which  measures 1.1 x 1.2 x 1.2 cm, series 3, image 71. Upper Abdomen: Surgical changes noted at the EG junction. No acute finding  appreciated. Soft Tissues/Bones: No axillary adenopathy appreciated. No suspicious bony  lesion appreciated. PET scan 1/31/2020 imaging reviewed by me and showed  Mild metabolic activity in the right lower lobe nodule, SUV 2.95    CT chest 5/15/2020 imaging reviewed by me and showed  Stable 1.2 cm right lower lobe nodule    Vascular endothelial G factor 49    Assessment:       · RLL 1.2 cm lung nodule on CT 1/22/2020. Mild activity on PET scan 1/31/2020 with SUV of 2.95. Stable on repeat CT 5/15/2020  · Cystic lung disease. DDx [facility] and [facility].  Vascular endothelial growth factor D  · Mild COPD  · 35-pack-year history of smoking- quit Jan 2020       Plan:       Options of Bx, resection and watchful waiting were discussed with patient. I recommend radiographic follow up in 3 months. Patient feels more comfortable with this approach. Continue albuterol 2 puffs Q4-6 hrs PRN  Advised to continue with smoking cessation        Tobie Kawasaki is a 48 y.o. female being evaluated by a Virtual Visit (video visit) encounter to address concerns as mentioned above. A caregiver was present when appropriate. Due to this being a TeleHealth encounter ([facility]), evaluation of the following organ systems was limited: Vitals/Constitutional/EENT/Resp/CV/GI//MS/Neuro/Skin/Heme-Lymph-Imm.   Pursuant

## 2020-08-07 ENCOUNTER — TELEPHONE (OUTPATIENT)
Dept: CARDIOTHORACIC SURGERY | Age: 55
End: 2020-08-07

## 2020-08-07 NOTE — TELEPHONE ENCOUNTER
Notified patient of scheduling of her CT scan chest no contrast ordered by Dr. Raheel Leal for surveillance of her lung nodule. The test is scheduled at [facility] on Friday, August 14, 2020 at 7:30 am, arrive at 7:00 am, no prep. The patient understood the instructions and had no questions.

## 2020-08-14 ENCOUNTER — HOSPITAL ENCOUNTER (OUTPATIENT)
Dept: CT IMAGING | Age: 55
Discharge: HOME | End: 2020-08-14
Payer: COMMERCIAL

## 2020-08-14 PROCEDURE — 71250 CT THORAX DX C-: CPT

## 2020-08-20 ENCOUNTER — TELEPHONE (OUTPATIENT)
Dept: CARDIOTHORACIC SURGERY | Age: 55
End: 2020-08-20

## 2020-08-20 NOTE — TELEPHONE ENCOUNTER
Dr. Ayad Ding reviewed results of CT scan. Lung nodule is unchanged. Would like to repeat in 6 months. Spoke to patient. She is agreeable to plan. Placed back in surveillance. No other questions or concerns at this time.

## 2020-08-25 ENCOUNTER — VIRTUAL VISIT (OUTPATIENT)
Dept: PULMONOLOGY | Age: 55
End: 2020-08-25
Payer: COMMERCIAL

## 2020-08-25 PROCEDURE — 99443 PR PHYS/QHP TELEPHONE EVALUATION 21-30 MIN: CPT | Performed by: INTERNAL MEDICINE

## 2020-08-25 RX ORDER — ALBUTEROL SULFATE 90 UG/1
2 AEROSOL, METERED RESPIRATORY (INHALATION) EVERY 6 HOURS PRN
Qty: 1 INHALER | Refills: 6 | Status: SHIPPED | OUTPATIENT
Start: 2020-08-25 | End: 2021-09-14

## 2020-08-25 NOTE — PROGRESS NOTES
Inhale 2 puffs into the lungs every 6 hours as needed for Wheezing or Shortness of Breath, Disp: 1 Inhaler, Rfl: 6          Objective:   PHYSICAL EXAM:    Telephone visit not able to obtain physical exam                   DATA reviewed by me:   PFTs 01/27/2020 FVC 3.71(103%) FEV1 2.54(90%) FEV1/FVC 69% TLC 5.72(105%) DLCO 18.26 (74%) 6MW 1140 F LO2 97%     CT chest 1/22/2020  Mediastinum: Limited evaluation without IV contrast.  No mediastinal  adenopathy is appreciated. Central airways appear patent. Lungs/pleura: Mild scattered emphysematous changes are noted. Innumerable  small cysts are seen scattered in the lung parenchyma bilaterally. No  pleural effusion or focal intrapulmonary dense consolidative process. Within  the right lower lobe there is a noncalcified round pulmonary nodule which  measures 1.1 x 1.2 x 1.2 cm, series 3, image 71. Upper Abdomen: Surgical changes noted at the EG junction. No acute finding  appreciated. Soft Tissues/Bones: No axillary adenopathy appreciated. No suspicious bony  lesion appreciated. PET scan 1/31/2020 imaging reviewed by me and showed  Mild metabolic activity in the right lower lobe nodule, SUV 2.95    CT chest 5/15/2020 imaging reviewed by me and showed  Stable 1.2 cm right lower lobe nodule    CT chest 8/14/2020 imaging reviewed by me and showed  Stable RLL nodule      Vascular endothelial G factor 49    Assessment:       · RLL 1.2 cm lung nodule stable since 1/22/2020. Most recent CT 8/14/2020. Mild activity on PET scan 1/31/2020 with SUV of 2.95. Evaluated by CT surgery. DDx granuloma and slow-growing malignancy. · Cystic lung disease. DDx [facility] and [facility].  Vascular endothelial growth factor D  · Mild COPD  · 35-pack-year history of smoking- quit Jan 2020       Plan:       Options of Bx, resection and watchful waiting were discussed with patient. We will continue to follow-up radiographically given stability, neck CT February 2021.   Continue albuterol 2 puffs Q4-6 hrs PRN  Pneumococcal vaccine when able   Advised to get influenza vaccine this year   Advised to continue with smoking cessation  Follow up after CT chest in 6 months           Lyndsay Hurst is a 48 y.o. female evaluated via telephone on 8/25/2020. Consent:  She and/or health care decision maker is aware that that she may receive a bill for this telephone service, depending on her insurance coverage, and has provided verbal consent to proceed: Yes       Documentation:  I communicated with the patient and/or health care decision maker about: See above   Details of this discussion including any medical advice provided: See above       I Affirm this is a Patient Initiated Episode with an Established Patient who has not had a related appointment within my department in the past 7 days or scheduled within the next 24 hours.     Total Time: 21-30 minutes     Note: not billable if this call serves to triage the patient into an appointment for the relevant concern      Liberty Aase

## 2021-02-15 ENCOUNTER — HOSPITAL ENCOUNTER (OUTPATIENT)
Dept: CT IMAGING | Age: 56
Discharge: HOME | End: 2021-02-15
Payer: COMMERCIAL

## 2021-02-15 DIAGNOSIS — R91.1 PULMONARY NODULE: ICD-10-CM

## 2021-02-15 PROCEDURE — 71250 CT THORAX DX C-: CPT

## 2021-03-01 ENCOUNTER — VIRTUAL VISIT (OUTPATIENT)
Dept: PULMONOLOGY | Age: 56
End: 2021-03-01
Payer: COMMERCIAL

## 2021-03-01 DIAGNOSIS — R91.1 LUNG NODULE: Primary | ICD-10-CM

## 2021-03-01 DIAGNOSIS — J44.9 CHRONIC OBSTRUCTIVE PULMONARY DISEASE, UNSPECIFIED COPD TYPE (HCC): ICD-10-CM

## 2021-03-01 DIAGNOSIS — Z87.891 PERSONAL HISTORY OF TOBACCO USE, PRESENTING HAZARDS TO HEALTH: ICD-10-CM

## 2021-03-01 PROCEDURE — 99443 PR PHYS/QHP TELEPHONE EVALUATION 21-30 MIN: CPT | Performed by: INTERNAL MEDICINE

## 2021-03-01 RX ORDER — SIMVASTATIN 20 MG
TABLET ORAL
COMMUNITY
Start: 2021-02-15

## 2021-03-01 NOTE — PROGRESS NOTES
[facility]                                                      TELEHEALTH EVALUATION: Service performed was Audio (During J[facility]QE-87 public health emergency) and not a face-to-face visit             CHIEF COMPLAINT: Follow-up CT        HPI:   CT chest reviewed by me and noted below. Results were dicussed with patient and multiple good questions were answered. Feels more short of breath and has been using albuterol more frequently   At least 3 times/week   No smoking             Past Medical History:   Diagnosis Date    Anemia     Hiatal hernia     Migraines     Pulmonary nodule        Past Surgical History:        Procedure Laterality Date    HERNIA REPAIR         Allergies:  is allergic to codeine; percocet [oxycodone-acetaminophen]; and vicodin [hydrocodone-acetaminophen]. Social History:    TOBACCO:   reports that she quit smoking about 13 months ago. Her smoking use included cigarettes. She has a 35.00 pack-year smoking history. She has never used smokeless tobacco.  ETOH:   reports current alcohol use.       Family History:   No lung cancer       Current Medications:    Current Outpatient Medications:     simvastatin (ZOCOR) 20 MG tablet, , Disp: , Rfl:     albuterol sulfate HFA (PROAIR HFA) 108 (90 Base) MCG/ACT inhaler, Inhale 2 puffs into the lungs every 6 hours as needed for Wheezing or Shortness of Breath, Disp: 1 Inhaler, Rfl: 6    fluticasone (FLONASE) 50 MCG/ACT nasal spray, 1 spray by Each Nostril route daily, Disp: , Rfl:     traZODone (DESYREL) 50 MG tablet, Take 50 mg by mouth nightly as needed for Sleep, Disp: , Rfl:     Multiple Vitamin (MULTI-VITAMIN DAILY PO), Take by mouth, Disp: , Rfl:     cetirizine (ZYRTEC) 10 MG tablet, Take 10 mg by mouth daily, Disp: , Rfl:     Acetaminophen (TYLENOL EXTRA STRENGTH PO), Take by mouth daily as needed , Disp: , Rfl:     hydrOXYzine (ATARAX) 25 MG tablet, Take 25 mg by smoking- quit Jan 2020       Plan:       Options of Bx, resection and watchful waiting were discussed with patient. We will continue to follow-up radiographically given stability, CT without contrast August 2021   Continue albuterol 2 puffs Q4-6 hrs PRN  Trial of Spiriva Respimat: Two inhalations (5 mcg) once daily (maximum: 2 inhalations per 24 hours)  Pneumococcal vaccine when able and influenza vaccine  Advised to continue with smoking cessation  Follow up in 6 months or sooner if needed             Lillian Garza is a 48 y.o. female evaluated via telephone on 3/1/2021. Consent:  She and/or health care decision maker is aware that that she may receive a bill for this telephone service, depending on her insurance coverage, and has provided verbal consent to proceed: Yes       Documentation:  I communicated with the patient and/or health care decision maker about: See above   Details of this discussion including any medical advice provided: See above       I Affirm this is a Patient Initiated Episode with an Established Patient who has not had a related appointment within my department in the past 7 days or scheduled within the next 24 hours.     Total Time: 21-30 minutes     Note: not billable if this call serves to triage the patient into an appointment for the relevant concern      Lluvia Cassidy

## 2021-06-29 ENCOUNTER — APPOINTMENT (OUTPATIENT)
Dept: CT IMAGING | Age: 56
End: 2021-06-29
Payer: COMMERCIAL

## 2021-06-29 ENCOUNTER — APPOINTMENT (OUTPATIENT)
Dept: GENERAL RADIOLOGY | Age: 56
End: 2021-06-29
Payer: COMMERCIAL

## 2021-06-29 ENCOUNTER — HOSPITAL ENCOUNTER (EMERGENCY)
Age: 56
Discharge: ANOTHER HOSPITAL | End: 2021-06-30
Attending: STUDENT IN AN ORGANIZED HEALTH CARE EDUCATION/TRAINING PROGRAM
Payer: COMMERCIAL

## 2021-06-29 DIAGNOSIS — H53.8 BLURRY VISION, BILATERAL: Primary | ICD-10-CM

## 2021-06-29 LAB
A/G RATIO: 1.3 (ref 1.1–2.2)
ALBUMIN SERPL-MCNC: 4.3 G/DL (ref 3.4–5)
ALP BLD-CCNC: 59 U/L (ref 40–129)
ALT SERPL-CCNC: 21 U/L (ref 10–40)
ANION GAP SERPL CALCULATED.3IONS-SCNC: 10 MMOL/L (ref 3–16)
AST SERPL-CCNC: 21 U/L (ref 15–37)
BASOPHILS ABSOLUTE: 0.1 K/UL (ref 0–0.2)
BASOPHILS RELATIVE PERCENT: 1.2 %
BILIRUB SERPL-MCNC: <0.2 MG/DL (ref 0–1)
BUN BLDV-MCNC: 8 MG/DL (ref 7–20)
CALCIUM SERPL-MCNC: 9.6 MG/DL (ref 8.3–10.6)
CHLORIDE BLD-SCNC: 101 MMOL/L (ref 99–110)
CO2: 27 MMOL/L (ref 21–32)
CREAT SERPL-MCNC: 0.6 MG/DL (ref 0.6–1.1)
EOSINOPHILS ABSOLUTE: 0.2 K/UL (ref 0–0.6)
EOSINOPHILS RELATIVE PERCENT: 4 %
GFR AFRICAN AMERICAN: >60
GFR NON-AFRICAN AMERICAN: >60
GLOBULIN: 3.2 G/DL
GLUCOSE BLD-MCNC: 139 MG/DL (ref 70–99)
GLUCOSE BLD-MCNC: 149 MG/DL (ref 70–99)
HCT VFR BLD CALC: 42.1 % (ref 36–48)
HEMOGLOBIN: 14.1 G/DL (ref 12–16)
INR BLD: 0.98 (ref 0.86–1.14)
LYMPHOCYTES ABSOLUTE: 2.4 K/UL (ref 1–5.1)
LYMPHOCYTES RELATIVE PERCENT: 48.3 %
MAGNESIUM: 1.9 MG/DL (ref 1.8–2.4)
MCH RBC QN AUTO: 30 PG (ref 26–34)
MCHC RBC AUTO-ENTMCNC: 33.4 G/DL (ref 31–36)
MCV RBC AUTO: 89.8 FL (ref 80–100)
MONOCYTES ABSOLUTE: 0.4 K/UL (ref 0–1.3)
MONOCYTES RELATIVE PERCENT: 7.5 %
NEUTROPHILS ABSOLUTE: 1.9 K/UL (ref 1.7–7.7)
NEUTROPHILS RELATIVE PERCENT: 39 %
PDW BLD-RTO: 13.4 % (ref 12.4–15.4)
PERFORMED ON: ABNORMAL
PLATELET # BLD: 270 K/UL (ref 135–450)
PMV BLD AUTO: 7.9 FL (ref 5–10.5)
POTASSIUM REFLEX MAGNESIUM: 3.5 MMOL/L (ref 3.5–5.1)
PROTHROMBIN TIME: 11.4 SEC (ref 10–13.2)
RBC # BLD: 4.69 M/UL (ref 4–5.2)
SODIUM BLD-SCNC: 138 MMOL/L (ref 136–145)
TOTAL PROTEIN: 7.5 G/DL (ref 6.4–8.2)
TROPONIN: <0.01 NG/ML
WBC # BLD: 5 K/UL (ref 4–11)

## 2021-06-29 PROCEDURE — 99291 CRITICAL CARE FIRST HOUR: CPT

## 2021-06-29 PROCEDURE — 85025 COMPLETE CBC W/AUTO DIFF WBC: CPT

## 2021-06-29 PROCEDURE — 70498 CT ANGIOGRAPHY NECK: CPT

## 2021-06-29 PROCEDURE — 71045 X-RAY EXAM CHEST 1 VIEW: CPT

## 2021-06-29 PROCEDURE — 80053 COMPREHEN METABOLIC PANEL: CPT

## 2021-06-29 PROCEDURE — 85610 PROTHROMBIN TIME: CPT

## 2021-06-29 PROCEDURE — 93005 ELECTROCARDIOGRAM TRACING: CPT | Performed by: NURSE PRACTITIONER

## 2021-06-29 PROCEDURE — 84484 ASSAY OF TROPONIN QUANT: CPT

## 2021-06-29 PROCEDURE — 83735 ASSAY OF MAGNESIUM: CPT

## 2021-06-29 PROCEDURE — 99284 EMERGENCY DEPT VISIT MOD MDM: CPT

## 2021-06-29 PROCEDURE — 70450 CT HEAD/BRAIN W/O DYE: CPT

## 2021-06-29 PROCEDURE — 6360000004 HC RX CONTRAST MEDICATION: Performed by: NURSE PRACTITIONER

## 2021-06-29 RX ADMIN — IOPAMIDOL 85 ML: 755 INJECTION, SOLUTION INTRAVENOUS at 20:22

## 2021-06-29 ASSESSMENT — ENCOUNTER SYMPTOMS
SHORTNESS OF BREATH: 0
EYE PAIN: 0
SORE THROAT: 0
EYE DISCHARGE: 0
ABDOMINAL PAIN: 0
PHOTOPHOBIA: 0
EYE ITCHING: 0
RHINORRHEA: 0
EYE REDNESS: 0
COLOR CHANGE: 0

## 2021-06-29 ASSESSMENT — TONOMETRY
OD_IOP_MMHG: 11
OS_IOP_MMHG: 16

## 2021-06-29 ASSESSMENT — VISUAL ACUITY
OU: 20/20
OS: 20/20
OD: 20/20

## 2021-06-30 ENCOUNTER — HOSPITAL ENCOUNTER (OUTPATIENT)
Age: 56
Setting detail: OBSERVATION
Discharge: HOME | End: 2021-07-01
Attending: HOSPITALIST | Admitting: INTERNAL MEDICINE
Payer: COMMERCIAL

## 2021-06-30 VITALS
HEIGHT: 65 IN | DIASTOLIC BLOOD PRESSURE: 85 MMHG | WEIGHT: 190 LBS | BODY MASS INDEX: 31.65 KG/M2 | OXYGEN SATURATION: 99 % | HEART RATE: 94 BPM | SYSTOLIC BLOOD PRESSURE: 144 MMHG | RESPIRATION RATE: 16 BRPM | TEMPERATURE: 99.1 F

## 2021-06-30 PROBLEM — H53.8 BLURRED VISION: Status: ACTIVE | Noted: 2021-06-30

## 2021-06-30 LAB
EKG ATRIAL RATE: 91 BPM
EKG DIAGNOSIS: NORMAL
EKG P AXIS: 60 DEGREES
EKG P-R INTERVAL: 132 MS
EKG Q-T INTERVAL: 364 MS
EKG QRS DURATION: 76 MS
EKG QTC CALCULATION (BAZETT): 447 MS
EKG R AXIS: 39 DEGREES
EKG T AXIS: 55 DEGREES
EKG VENTRICULAR RATE: 91 BPM
TROPONIN: <0.01 NG/ML
TROPONIN: <0.01 NG/ML

## 2021-06-30 PROCEDURE — 2580000003 HC RX 258: Performed by: INTERNAL MEDICINE

## 2021-06-30 PROCEDURE — 97116 GAIT TRAINING THERAPY: CPT

## 2021-06-30 PROCEDURE — 36415 COLL VENOUS BLD VENIPUNCTURE: CPT

## 2021-06-30 PROCEDURE — 6370000000 HC RX 637 (ALT 250 FOR IP): Performed by: INTERNAL MEDICINE

## 2021-06-30 PROCEDURE — G0379 DIRECT REFER HOSPITAL OBSERV: HCPCS

## 2021-06-30 PROCEDURE — 94640 AIRWAY INHALATION TREATMENT: CPT

## 2021-06-30 PROCEDURE — 97161 PT EVAL LOW COMPLEX 20 MIN: CPT

## 2021-06-30 PROCEDURE — 84484 ASSAY OF TROPONIN QUANT: CPT

## 2021-06-30 PROCEDURE — 93010 ELECTROCARDIOGRAM REPORT: CPT | Performed by: INTERNAL MEDICINE

## 2021-06-30 PROCEDURE — G0378 HOSPITAL OBSERVATION PER HR: HCPCS

## 2021-06-30 RX ORDER — BUPROPION HYDROCHLORIDE 150 MG/1
150 TABLET ORAL EVERY MORNING
COMMUNITY
End: 2021-08-19

## 2021-06-30 RX ORDER — POLYETHYLENE GLYCOL 3350 17 G/17G
17 POWDER, FOR SOLUTION ORAL DAILY PRN
Status: DISCONTINUED | OUTPATIENT
Start: 2021-06-30 | End: 2021-07-01 | Stop reason: HOSPADM

## 2021-06-30 RX ORDER — SODIUM CHLORIDE 9 MG/ML
25 INJECTION, SOLUTION INTRAVENOUS PRN
Status: DISCONTINUED | OUTPATIENT
Start: 2021-06-30 | End: 2021-07-01 | Stop reason: HOSPADM

## 2021-06-30 RX ORDER — ASPIRIN 81 MG/1
81 TABLET ORAL DAILY
Status: DISCONTINUED | OUTPATIENT
Start: 2021-06-30 | End: 2021-07-01 | Stop reason: HOSPADM

## 2021-06-30 RX ORDER — SODIUM CHLORIDE 0.9 % (FLUSH) 0.9 %
5-40 SYRINGE (ML) INJECTION PRN
Status: DISCONTINUED | OUTPATIENT
Start: 2021-06-30 | End: 2021-07-01 | Stop reason: HOSPADM

## 2021-06-30 RX ORDER — ASPIRIN 81 MG/1
81 TABLET, CHEWABLE ORAL DAILY
COMMUNITY

## 2021-06-30 RX ORDER — ONDANSETRON 2 MG/ML
4 INJECTION INTRAMUSCULAR; INTRAVENOUS EVERY 6 HOURS PRN
Status: DISCONTINUED | OUTPATIENT
Start: 2021-06-30 | End: 2021-07-01 | Stop reason: HOSPADM

## 2021-06-30 RX ORDER — ASPIRIN 300 MG/1
300 SUPPOSITORY RECTAL DAILY
Status: DISCONTINUED | OUTPATIENT
Start: 2021-06-30 | End: 2021-07-01 | Stop reason: HOSPADM

## 2021-06-30 RX ORDER — ALBUTEROL SULFATE 90 UG/1
2 AEROSOL, METERED RESPIRATORY (INHALATION) 2 TIMES DAILY
Status: DISCONTINUED | OUTPATIENT
Start: 2021-06-30 | End: 2021-07-01 | Stop reason: HOSPADM

## 2021-06-30 RX ORDER — SODIUM CHLORIDE 0.9 % (FLUSH) 0.9 %
5-40 SYRINGE (ML) INJECTION EVERY 12 HOURS SCHEDULED
Status: DISCONTINUED | OUTPATIENT
Start: 2021-06-30 | End: 2021-07-01 | Stop reason: HOSPADM

## 2021-06-30 RX ORDER — BUPROPION HYDROCHLORIDE 150 MG/1
150 TABLET ORAL DAILY
Status: DISCONTINUED | OUTPATIENT
Start: 2021-06-30 | End: 2021-07-01 | Stop reason: HOSPADM

## 2021-06-30 RX ORDER — ALBUTEROL SULFATE 90 UG/1
2 AEROSOL, METERED RESPIRATORY (INHALATION) EVERY 6 HOURS PRN
Status: DISCONTINUED | OUTPATIENT
Start: 2021-06-30 | End: 2021-06-30

## 2021-06-30 RX ORDER — ONDANSETRON 4 MG/1
4 TABLET, ORALLY DISINTEGRATING ORAL EVERY 8 HOURS PRN
Status: DISCONTINUED | OUTPATIENT
Start: 2021-06-30 | End: 2021-07-01 | Stop reason: HOSPADM

## 2021-06-30 RX ORDER — FLUTICASONE PROPIONATE 50 MCG
1 SPRAY, SUSPENSION (ML) NASAL DAILY
Status: DISCONTINUED | OUTPATIENT
Start: 2021-06-30 | End: 2021-07-01 | Stop reason: HOSPADM

## 2021-06-30 RX ORDER — TRAZODONE HYDROCHLORIDE 50 MG/1
50 TABLET ORAL NIGHTLY PRN
Status: DISCONTINUED | OUTPATIENT
Start: 2021-06-30 | End: 2021-07-01 | Stop reason: HOSPADM

## 2021-06-30 RX ORDER — CETIRIZINE HYDROCHLORIDE 10 MG/1
10 TABLET ORAL DAILY
Status: DISCONTINUED | OUTPATIENT
Start: 2021-06-30 | End: 2021-07-01 | Stop reason: HOSPADM

## 2021-06-30 RX ORDER — ATORVASTATIN CALCIUM 10 MG/1
10 TABLET, FILM COATED ORAL DAILY
Status: DISCONTINUED | OUTPATIENT
Start: 2021-06-30 | End: 2021-07-01 | Stop reason: HOSPADM

## 2021-06-30 RX ORDER — ASPIRIN 81 MG/1
81 TABLET, CHEWABLE ORAL DAILY
Status: DISCONTINUED | OUTPATIENT
Start: 2021-06-30 | End: 2021-06-30 | Stop reason: SDUPTHER

## 2021-06-30 RX ORDER — BUSPIRONE HYDROCHLORIDE 5 MG/1
10 TABLET ORAL 2 TIMES DAILY
Status: DISCONTINUED | OUTPATIENT
Start: 2021-06-30 | End: 2021-07-01 | Stop reason: HOSPADM

## 2021-06-30 RX ADMIN — Medication 2 PUFF: at 20:43

## 2021-06-30 RX ADMIN — CETIRIZINE HYDROCHLORIDE 10 MG: 10 TABLET, FILM COATED ORAL at 14:17

## 2021-06-30 RX ADMIN — ATORVASTATIN CALCIUM 10 MG: 10 TABLET, FILM COATED ORAL at 14:17

## 2021-06-30 RX ADMIN — Medication 10 ML: at 20:38

## 2021-06-30 RX ADMIN — FLUTICASONE PROPIONATE 1 SPRAY: 50 SPRAY, METERED NASAL at 14:17

## 2021-06-30 RX ADMIN — ASPIRIN 81 MG: 81 TABLET, COATED ORAL at 14:17

## 2021-06-30 ASSESSMENT — PAIN SCALES - GENERAL
PAINLEVEL_OUTOF10: 0
PAINLEVEL_OUTOF10: 2

## 2021-06-30 ASSESSMENT — PAIN DESCRIPTION - ORIENTATION: ORIENTATION: RIGHT;LEFT

## 2021-06-30 ASSESSMENT — PAIN DESCRIPTION - LOCATION: LOCATION: KNEE

## 2021-06-30 ASSESSMENT — PAIN DESCRIPTION - PAIN TYPE: TYPE: CHRONIC PAIN

## 2021-06-30 ASSESSMENT — PAIN DESCRIPTION - DESCRIPTORS: DESCRIPTORS: ACHING

## 2021-06-30 NOTE — PROGRESS NOTES
Pt admitted to room 538 from [facility]. Pt admitted for blurred vision, however symptoms have resolved. Pt independent in room, vital signs stable. Pt scoring 0 on NIH scale, shift assessment completed.

## 2021-06-30 NOTE — H&P
Hospital Medicine History & Physical      PCP: JACKSON Trinidad - CNP    Date of Admission: 6/30/2021    Date of Service: Pt seen/examined on 6/30/2021 and Admitted to Inpatient with expected LOS greater than two midnights due to medical therapy. Chief Complaint: Yessica Balderas started around 5:30 PM yesterday, patient was transferred from [facility] ER for further management      History Of Present Illness:  (    49 y.o. female who presented to [facility] with above complaint. 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 She was feeling okay at that time and she started to do crocheting for some time and watch TV. Later she realized that she could not see properly and all were double. Double vision lasted more than 5 to 6 hours. She reached emergency room around 7:30 PM.  Initial work-up was negative for acute CVA and she was transferred over here for further management. Currently her symptoms completely gone and she does not have any vision changes, slurred speech, difficulty in swallowing, focal neurological symptoms over the extremities. No change in mental status fever cough shortness of breath abdominal pain nausea or vomiting. She has a history of migraine but she is not on any medication. She gets headache very seldom. This morning she experienced some pain over the eyes and middle of the head. Past Medical History:          Diagnosis Date    Anemia     Asthma     COPD (chronic obstructive pulmonary disease) (HCC)     Hiatal hernia     History of blood transfusion     Hyperlipidemia     Migraines     Pulmonary nodule        Past Surgical History:          Procedure Laterality Date    HERNIA REPAIR         Medications Prior to Admission:      Prior to Admission medications    Medication Sig Start Date End Date Taking?  Authorizing Provider   aspirin 81 MG chewable tablet Take 81 mg by mouth daily   Yes Historical Provider, MD   simvastatin (ZOCOR) 20 MG tablet  2/15/21  Yes Historical Provider, MD   tiotropium (SPIRIVA RESPIMAT) 2.5 MCG/ACT AERS inhaler Inhale 2 puffs into the lungs daily 3/1/21  Yes Flaquito Taylor MD   albuterol sulfate HFA (PROAIR HFA) 108 (90 Base) MCG/ACT inhaler Inhale 2 puffs into the lungs every 6 hours as needed for Wheezing or Shortness of Breath 8/25/20  Yes Flaquito Taylor MD   fluticasone (FLONASE) 50 MCG/ACT nasal spray 1 spray by Each Nostril route daily   Yes Historical Provider, MD   Multiple Vitamin (MULTI-VITAMIN DAILY PO) Take by mouth   Yes Historical Provider, MD   cetirizine (ZYRTEC) 10 MG tablet Take 10 mg by mouth daily   Yes Historical Provider, MD   Acetaminophen (TYLENOL EXTRA STRENGTH PO) Take by mouth daily as needed    Yes Historical Provider, MD   hydrOXYzine (ATARAX) 25 MG tablet Take 25 mg by mouth 3 times daily    Historical Provider, MD   busPIRone (BUSPAR) 10 MG tablet Take 10 mg by mouth 2 times daily    Historical Provider, MD   traZODone (DESYREL) 50 MG tablet Take 50 mg by mouth nightly as needed for Sleep    Historical Provider, MD   buPROPion (WELLBUTRIN) 75 MG tablet Take 150 mg by mouth daily     Historical Provider, MD       Allergies:  Codeine, Percocet [oxycodone-acetaminophen], and Vicodin [hydrocodone-acetaminophen]    Social History:      The patient currently lives at home    TOBACCO:   reports that she quit smoking about 17 months ago. Her smoking use included cigarettes. She has a 35.00 pack-year smoking history. She has never used smokeless tobacco.  ETOH:   reports current alcohol use. E-Cigarettes/Vaping Use     Questions Responses    E-Cigarette/Vaping Use Former User    Start Date     Passive Exposure     Quit Date     Counseling Given     Comments Unknown            Family History:       Reviewed in detail and negative for DM, CAD, Cancer, CVA. Positive as follows:    No family history on file. REVIEW OF SYSTEMS COMPLETED:   Pertinent positives as noted in the HPI.  All other systems reviewed and negative. PHYSICAL EXAM PERFORMED:    BP (!) 149/87   Pulse 100   Temp 98.3 °F (36.8 °C) (Oral)   Resp 20   SpO2 98%     General appearance:  No apparent distress, appears stated age and cooperative. HEENT:  Normal cephalic, atraumatic without obvious deformity. Pupils equal, round, and reactive to light. Extra ocular muscles intact. Conjunctivae/corneas clear. Neck: Supple, with full range of motion. No jugular venous distention. Trachea midline. Respiratory:  Normal respiratory effort. Clear to auscultation, bilaterally without Rales/Wheezes/Rhonchi. Cardiovascular:  Regular rate and rhythm with normal S1/S2 without murmurs, rubs or gallops. Abdomen: Soft, non-tender, non-distended with normal bowel sounds. Musculoskeletal:  No clubbing, cyanosis or edema bilaterally. Full range of motion without deformity. Skin: Skin color, texture, turgor normal.  No rashes or lesions. Neurologic:  Neurovascularly intact without any focal sensory/motor deficits.  Cranial nerves: II-XII intact, grossly non-focal.  Psychiatric:  Alert and oriented, thought content appropriate, normal insight  Capillary Refill: Brisk,3 seconds, normal  Peripheral Pulses: +2 palpable, equal bilaterally       Labs:     Recent Labs     06/29/21 2004   WBC 5.0   HGB 14.1   HCT 42.1        Recent Labs     06/29/21 2004      K 3.5      CO2 27   BUN 8   CREATININE 0.6   CALCIUM 9.6     Recent Labs     06/29/21 2004   AST 21   ALT 21   BILITOT <0.2   ALKPHOS 59     Recent Labs     06/29/21 2004   INR 0.98     Recent Labs     06/29/21 2004   [facility] <0.01       Urinalysis:      Lab Results   Component Value Date    NITRU Negative 05/14/2012    WBCUA 3-5 05/14/2012    BACTERIA Many 05/14/2012    BLOODU Negative 05/14/2012    SPECGRAV 1.020 05/14/2012    GLUCOSEU Negative 05/14/2012       Radiology:     CXR: I have reviewed the CXR with the following interpretation: No acute abnormality  EKG:  I have reviewed the EKG with the following interpretation: Sinus rhythm    MRI brain without contrast    (Results Pending)     CT head and CTA head and neck done at [facility] no acute abnormalities other than 1 cm pineal gland cyst  ASSESSMENT:    Active Hospital Problems    Diagnosis Date Noted    Blurred vision [H53.8] 06/30/2021         PLAN:    Blurred vision-transient-admit, neurochecks, monitor vitals, aspirin, statin, MRI of the brain, echocardiogram, neurology evaluation, PT OT and speech    History of bronchial asthma-stable continue home inhalers    Anxiety-BuSpar and Wellbutrin    DVT Prophylaxis: Lovenox  Diet: Diet NPO regular diet if passed swallowing  Code Status: Full Code    PT/OT Eval Status: Ordered    Dispo -1 to 2 days       Kelley Mariee MD    Thank you JACKSON Auguste CNP for the opportunity to be involved in this patient's care. If you have any questions or concerns please feel free to contact me at [phone].

## 2021-06-30 NOTE — ED PROVIDER NOTES
[address] ED  EMERGENCY DEPARTMENT ENCOUNTER        Pt Name: Ines Sanchez  MRN: [id number]  Armstrongfron 1965  Date of evaluation: 6/29/2021  Provider: JACKSON Castellon CNP  PCP: JACKSON Lozano CNP  ED Attending: Kp Kearney MD    CHIEF COMPLAINT       Chief Complaint   Patient presents with   [facility] Problem     Patient states that she noticed that her vision began to be blurred, after crocheting and then looking up at the TV. Patient states that her vision was normal at 1700. HISTORY OF PRESENT ILLNESS   (Location/Symptom, Timing/Onset, Context/Setting, Quality, Duration, Modifying Factors, Severity)  Note limiting factors. Ines Sanchez is a 55 y.o. female for blurry vision. Onset was 1630 today. Context includes pt states that she recently learned how to angel and started crocheting today when she came home from work. Patient states that around 430 this evening she started having blurry vision. She denies any other symptoms. Patient cleaned her glasses thinking that it was her glasses however she continued to have blurry vision to both of her eyes. Alleviating factors include nothing. Aggravating factors include nothing. Pain is 0/10. Nothing has been used for pain today. Nursing Notes were all reviewed and agreed with or any disagreements were addressed  in the HPI. REVIEW OF SYSTEMS  (2-9 systems for level 4, 10 or more for level 5)     Review of Systems   Constitutional: Negative for fever. HENT: Negative for congestion, rhinorrhea and sore throat. Eyes: Positive for visual disturbance. Negative for photophobia, pain, discharge, redness and itching. Blurry vision bilaterally   Respiratory: Negative for shortness of breath. Cardiovascular: Negative for chest pain. Gastrointestinal: Negative for abdominal pain. Genitourinary: Negative for decreased urine volume and difficulty urinating.    Musculoskeletal: Negative for arthralgias and myalgias. Skin: Negative for color change and rash. Neurological: Negative for dizziness and light-headedness. Psychiatric/Behavioral: Negative for agitation. All other systems reviewed and are negative. Positivesand Pertinent negatives as per HPI. Except as noted above in the ROS, all other systems were reviewed and negative. PAST MEDICAL HISTORY     Past Medical History:   Diagnosis Date    Anemia     Hiatal hernia     Migraines     Pulmonary nodule          SURGICAL HISTORY       Past Surgical History:   Procedure Laterality Date    HERNIA REPAIR           CURRENT MEDICATIONS       Previous Medications    ACETAMINOPHEN (TYLENOL EXTRA STRENGTH PO)    Take by mouth daily as needed     ALBUTEROL SULFATE HFA (PROAIR HFA) 108 (90 BASE) MCG/ACT INHALER    Inhale 2 puffs into the lungs every 6 hours as needed for Wheezing or Shortness of Breath    BUPROPION (WELLBUTRIN) 75 MG TABLET    Take 150 mg by mouth daily     BUSPIRONE (BUSPAR) 10 MG TABLET    Take 10 mg by mouth 2 times daily    CETIRIZINE (ZYRTEC) 10 MG TABLET    Take 10 mg by mouth daily    FLUTICASONE (FLONASE) 50 MCG/ACT NASAL SPRAY    1 spray by Each Nostril route daily    HYDROXYZINE (ATARAX) 25 MG TABLET    Take 25 mg by mouth 3 times daily    MULTIPLE VITAMIN (MULTI-VITAMIN DAILY PO)    Take by mouth    SIMVASTATIN (ZOCOR) 20 MG TABLET        TIOTROPIUM (SPIRIVA RESPIMAT) 2.5 MCG/ACT AERS INHALER    Inhale 2 puffs into the lungs daily    TRAZODONE (DESYREL) 50 MG TABLET    Take 50 mg by mouth nightly as needed for Sleep         ALLERGIES     Codeine, Percocet [oxycodone-acetaminophen], and Vicodin [hydrocodone-acetaminophen]    FAMILY HISTORY     History reviewed. No pertinent family history.       SOCIAL HISTORY       Social History     Socioeconomic History    Marital status:      Spouse name: None    Number of children: None    Years of education: None    Highest education level: None   Occupational History    None   Tobacco Use    Smoking status: Former Smoker     Packs/day: 1.00     Years: 35.00     Pack years: 35.00     Types: Cigarettes     Quit date: 2020     Years since quittin.4    Smokeless tobacco: Never Used   Vaping Use    Vaping Use: Former    Substances: Unknown   Substance and Sexual Activity    Alcohol use: Yes     Comment: rarely    Drug use: No    Sexual activity: None   Other Topics Concern    None   Social History Narrative    None     Social Determinants of Health     Financial Resource Strain:     Difficulty of Paying Living Expenses:    Food Insecurity:     Worried About Running Out of Food in the Last Year:     Ran Out of Food in the Last Year:    Transportation Needs:     Lack of Transportation (Medical):  Lack of Transportation (Non-Medical):    Physical Activity:     Days of Exercise per Week:     Minutes of Exercise per Session:    Stress:     Feeling of Stress :    Social Connections:     Frequency of Communication with Friends and Family:     Frequency of Social Gatherings with Friends and Family:     Attends Christianity Services:     Active Member of Clubs or Organizations:     Attends Club or Organization Meetings:     Marital Status:    Intimate Partner Violence:     Fear of Current or Ex-Partner:     Emotionally Abused:     Physically Abused:     Sexually Abused:        SCREENINGS   NIH Stroke Scale  Interval: Baseline  Level of Consciousness (1a. ): Alert  LOC Questions (1b. ):  Answers both correctly  LOC Commands (1c. ): Performs both tasks correctly  Best Gaze (2. ): Normal  Visual (3. ): No visual loss  Facial Palsy (4. ): Normal symmetrical movement  Motor Arm, Left (5a. ): No drift  Motor Arm, Right (5b. ): No drift  Motor Leg, Left (6a. ): No drift  Motor Leg, Right (6b. ): No drift  Limb Ataxia (7. ): Absent  Sensory (8. ): Normal  Best Language (9. ): No aphasia  Dysarthria (10. ): Normal  Extinction and Inattention (11): No abnormality  Total: 0Glasgow Coma Scale  Eye Opening: Spontaneous  Best Verbal Response: Oriented  Best Motor Response: Obeys commands  [place] Coma Scale Score: 15        PHYSICAL EXAM    (up to 7 for level 4, 8 ormore for level 5)     ED Triage Vitals [06/29/21 1952]   BP Temp Temp Source Pulse Resp SpO2 Height Weight   (!) 172/95 99.1 °F (37.3 °C) Oral 103 16 99 % 5' 5\" (1.651 m) 190 lb (86.2 kg)       Physical Exam  Constitutional:       Appearance: She is well-developed. HENT:      Head: Normocephalic and atraumatic. Eyes:      General: Lids are normal.         Right eye: No foreign body, discharge or hordeolum. Left eye: No foreign body, discharge or hordeolum. Intraocular pressure: Right eye pressure is 11 mmHg. Left eye pressure is 16 mmHg. Extraocular Movements: Extraocular movements intact. Conjunctiva/sclera:      Right eye: Right conjunctiva is not injected. No chemosis, exudate or hemorrhage. Left eye: Left conjunctiva is not injected. No chemosis, exudate or hemorrhage. Pupils: Pupils are equal, round, and reactive to light. Right eye: No fluorescein uptake. Left eye: No fluorescein uptake. Funduscopic exam:     Right eye: Red reflex present. Left eye: Red reflex present. Cardiovascular:      Rate and Rhythm: Normal rate. Pulmonary:      Effort: Pulmonary effort is normal. No respiratory distress. Abdominal:      General: There is no distension. Palpations: Abdomen is soft. Tenderness: There is no abdominal tenderness. Musculoskeletal:         General: Normal range of motion. Cervical back: Normal range of motion. Skin:     General: Skin is warm and dry. Neurological:      Mental Status: She is alert and oriented to person, place, and time. Psychiatric:         Mood and Affect: Mood is anxious.          DIAGNOSTIC RESULTS   LABS:    Labs Reviewed   COMPREHENSIVE METABOLIC PANEL W/ REFLEX TO MG FOR LOW K - Abnormal; Notable for the following components:       Result Value    Glucose 149 (*)     All other components within normal limits    Narrative:     Performed at:  [facility] [address],  [place], [facility]   Phone [phone]   POCT GLUCOSE - Abnormal; Notable for the following components:    POC Glucose 139 (*)     All other components within normal limits    Narrative:     Performed at:  [facility] [address],  [place], [facility]   Phone [phone]   CBC WITH AUTO DIFFERENTIAL    Narrative:     Performed at:  [facility] [address],  [place], [facility]   Phone [phone]   TROPONIN    Narrative:     Performed at:  [place] [address],  [place], [facility]   Phone [phone]   PROTIME-INR    Narrative:     Performed at:  [place] [address],  [place], [facility]   Phone [phone]   MAGNESIUM    Narrative:     Performed at:  [facility] [address],  [place], [facility]   Phone [phone]   POCT GLUCOSE       All other labs were within normal range or not returned as of this dictation. EKG: All EKG's are interpreted by the Emergency Department Physician who either signs or Co-signs this chart in the absence of a cardiologist.  Please see their note for interpretation of EKG. RADIOLOGY:     Portable chest x-ray interpreted by radiologist for    FINDINGS:   Monitoring leads project over the chest.  Right lower lobe pulmonary nodule   is grossly unchanged.  There is no acute airspace disease.  The heart size is   normal.  The costophrenic angles are sharp.  There is no discernible   pneumothorax.        CT of the head neck with contrast interpreted by radiologist for  FINDINGS:     CTA NECK:     AORTIC ARCH/ARCH VESSELS: No dissection or arterial injury.  No significant   stenosis of the brachiocephalic or subclavian arteries. CAROTID ARTERIES: No dissection, arterial injury, or hemodynamically   significant stenosis by NASCET criteria. VERTEBRAL ARTERIES: No dissection, arterial injury, or significant stenosis. SOFT TISSUES: Emphysema in the visualized lungs.  No cervical or superior   mediastinal lymphadenopathy.  The larynx and pharynx are unremarkable.  No   acute abnormality of the salivary and thyroid glands. BONES: No acute osseous abnormality. CTA HEAD:     ANTERIOR CIRCULATION: No significant stenosis of the intracranial internal   carotid, anterior cerebral, or middle cerebral arteries. No aneurysm. POSTERIOR CIRCULATION: No significant stenosis of the vertebral, basilar, or   posterior cerebral arteries. No aneurysm. OTHER: No dural venous sinus thrombosis on this non-dedicated study. BRAIN: No mass effect or midline shift. No extra-axial fluid collection. The   gray-white differentiation is maintained. CT of the head without contrast interpreted by radiologist for  Impression:    No acute intracranial abnormality. 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 Interpretation per the Radiologist below, if available at the time of this note:    XR CHEST PORTABLE   Final Result   1. No acute disease. 2. Stable nodule in the right lower lobe. CTA HEAD NECK W CONTRAST   Final Result   No significant arterial stenosis in the head and neck. CT HEAD WO CONTRAST   Final Result   No acute intracranial abnormality. 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       Critical results were called by Dr. Saima Ashley to Joel Linn NP on   6/29/2021 at 20:37.           CT HEAD WO CONTRAST    Result Date: 6/29/2021  EXAMINATION: CT OF THE HEAD WITHOUT CONTRAST  6/29/2021 8:24 pm TECHNIQUE: CT of the head was administration of intravenous contrast. Multiplanar reformatted images are provided for review. MIP images are provided for review. Stenosis of the internal carotid arteries measured using NASCET criteria. Dose modulation, iterative reconstruction, and/or weight based adjustment of the mA/kV was utilized to reduce the radiation dose to as low as reasonably achievable. This scan was analyzed using Viz. ai contact LVO. Identification of suspected findings is not for diagnostic use beyond notification. Viz LVO is limited to analysis of imaging data and should not be used in-lieu of full patient evaluation or relied upon to make or confirm diagnosis. COMPARISON: None. HISTORY: ORDERING SYSTEM PROVIDED HISTORY: code stroke blurred vision FINDINGS: CTA NECK: AORTIC ARCH/ARCH VESSELS: No dissection or arterial injury. No significant stenosis of the brachiocephalic or subclavian arteries. CAROTID ARTERIES: No dissection, arterial injury, or hemodynamically significant stenosis by NASCET criteria. VERTEBRAL ARTERIES: No dissection, arterial injury, or significant stenosis. SOFT TISSUES: Emphysema in the visualized lungs. No cervical or superior mediastinal lymphadenopathy. The larynx and pharynx are unremarkable. No acute abnormality of the salivary and thyroid glands. BONES: No acute osseous abnormality. CTA HEAD: ANTERIOR CIRCULATION: No significant stenosis of the intracranial internal carotid, anterior cerebral, or middle cerebral arteries. No aneurysm. POSTERIOR CIRCULATION: No significant stenosis of the vertebral, basilar, or posterior cerebral arteries. No aneurysm. OTHER: No dural venous sinus thrombosis on this non-dedicated study. BRAIN: No mass effect or midline shift. No extra-axial fluid collection. The gray-white differentiation is maintained. No significant arterial stenosis in the head and neck.          PROCEDURES   Unless otherwise noted below, none     Procedures    CRITICAL CARE TIME N/A    CONSULTS:  IP CONSULT TO PHARMACY  PHARMACY TO CHANGE BASE FLUIDS  IP CONSULT TO NEUROLOGY  IP CONSULT TO HOSPITALIST      EMERGENCY DEPARTMENT COURSE and DIFFERENTIAL DIAGNOSIS/MDM:   Vitals:    Vitals:    06/29/21 2302 06/29/21 2331 06/30/21 0001 06/30/21 0031   BP: (!) 140/79 (!) 150/93 (!) 176/104 (!) 116/98   Pulse: 84 83 87 83   Resp: 17 18 21 19   Temp:       TempSrc:       SpO2: 97% 99% 98% 96%   Weight:       Height:           Patient was given the following medications:  Medications   iopamidol (ISOVUE-370) 76 % injection 85 mL (85 mLs Intravenous Given 6/29/21 2022)       Patient was seen and evaluated by myself and Dr. Aki Luna. Patient was brought into the ED today for complaints of bilateral blurry vision. Patient reports that her symptoms started at 1630 today. Patient states that she was crocheting when this happened. She denies any headache or any other complaints. On exam in triage the patient was evaluated and code stroke protocol was initiated. Patient did not have any deficits with the exception of blurry vision. Patient did have an NIH of 0. Consult was placed to the stroke team.  At 2110 stroke team felt that TPA was not needed. Stroke team recommended follow-up with neuro-ophthalmology. Consult was then subsequently placed to neurology. Neurology recommended that she also be seen by a neuro ophthalmologist.  [facility] was unable to accept the patient as a transfer since they do not have any beds.  neurology recommended an MRI of the brain with and without contrast including single cuts through the orbits. He did feel this could be obtained tomorrow and did not need to have the on-call team activated. He recommended consulting with ophthalmology to discuss admission versus outpatient follow-up. Case was discussed with Dr. Stefanie Sky from ophthalmology who felt this was most likely not an ophthalmology issue and offered to see the patient in the morning.   Upon discussing these consults with my attending it was felt to be in the best interest of the patient to have the patient admitted to have an MRI completed and evaluated by neurology evaluate the patient as well. Consult was placed to the [facility] hospitalist.  [facility] hospitalist states that she is overwhelmed at this point and is not comfortable accepting the patient. Consult was placed to [facility]. however they did not have any beds available. The recommendation was to discussing the case with the [facility] doctor. Consult was placed to the [facility] hospitalist who also refused admission stating that there were beds at [facility] and the patient would be more appropriate to go to [facility]. Henrietta Morales clinical nurse manager was consulted at [facility] and a call was placed to the  on-call to evaluate the appropriate place to admit the patient. [address] [place] [address] hospitalist called stating that they would accept the patient. Patient's care was transferred to [facility] at this time. CRITICAL CARE NOTE:  There was a high probability of clinically significant life-threatening deterioration of the patient's condition requiring my urgent intervention. Total critical care time is 60 minutes. This includes vital sign monitoring, pulse oximetry monitoring, telemetry monitoring, clinical response to the IV medications, reviewing the nursing notes, consultation time, dictation/documentation time, and interpretation of the labwork. The patient tolerated their visit well. They were seen and evaluated by the attending physician, Clif Leal MD who agreed with the assessment and plan. The patient and / or the family were informed of the results of any tests, a time was given to answer questions, a plan was proposed and they agreed with plan. FINAL IMPRESSION      1.  Blurry vision, bilateral          DISPOSITION/PLAN   DISPOSITION Decision To Transfer 06/30/2021 02:28:35 AM      PATIENT REFERRED TO:  No follow-up provider specified.     DISCHARGE MEDICATIONS:  New Prescriptions    No medications on file       DISCONTINUED MEDICATIONS:  Discontinued Medications    No medications on file              (Please note that portions of this note were completed with a voice recognition program.  Efforts were made to edit the dictations but occasionally words are mis-transcribed.)    JACKSON Joshi CNP (electronically signed)       JACKSON Joshi CNP  06/30/21 [facility], APRN - CNP  06/30/21 024

## 2021-06-30 NOTE — PROGRESS NOTES
06/30/21 1433 -Neurology consult completed and Dr. Felix Reyes added to treatment team.    -January Martinez, PCA

## 2021-06-30 NOTE — PROGRESS NOTES
Physical Therapy    Facility/Department: [place] [address] - MED SURG/ORTHO  Initial Assessment and Discharge Summary    NAME: Clare Moyer  : 1965  MRN: [id number]    Date of Service: 2021    Discharge Recommendations:  Home with assist PRN   PT Equipment Recommendations  Equipment Needed: No    Assessment   Body structures, Functions, Activity limitations: Decreased functional mobility   Assessment: Patient seen for PT evaluation, gait, and stairs training. Patient cleared by RN for therapy participation this date. Patient agreeable to therapy. Patient admitted for blurred vision. Patient completed transfers, gait, and stairs independently. PT eval only. Recommending DC to home with assist prn. Treatment Diagnosis: decreased vision  Prognosis: Excellent  Decision Making: Low Complexity  PT Education: Goals;Plan of Care;Precautions;Orientation; Functional Mobility Training;Gait Training;PT Role;Transfer Training;General Safety; Disease Specific Education  Patient Education: pt educated on benefits of OOB mobility and role of PT - verbalizes understanding  Barriers to Learning: none  REQUIRES PT FOLLOW UP: No  Activity Tolerance  Activity Tolerance: Patient Tolerated treatment well  Activity Tolerance: 129/88, 99%, 77 bpm     Patient Diagnosis(es): There were no encounter diagnoses. has a past medical history of Anemia, Asthma, COPD (chronic obstructive pulmonary disease) ([address] Hiatal hernia, History of blood transfusion, Hyperlipidemia, Migraines, and Pulmonary nodule. has a past surgical history that includes hernia repair.     Restrictions  Restrictions/Precautions  Restrictions/Precautions: Up as Tolerated  Position Activity Restriction  Other position/activity restrictions: telemetry  Vision/Hearing  Vision: Impaired  Vision Exceptions: Wears glasses at all times  Hearing: Exceptions to [facility]  Hearing Exceptions: Hard of hearing/hearing concerns     Subjective  General  Chart Reviewed: Yes  Patient assessed for rehabilitation services?: Yes  Response To Previous Treatment: Not applicable  Family / Caregiver Present: No  Referring Practitioner: Bridger Dickerson  Referral Date : 06/30/21  Diagnosis: blurred vision  Follows Commands: Within Functional Limits  Subjective  Subjective: pt in bed, agreeable to therapy  Pain Screening  Patient Currently in Pain: Yes  Pain Assessment  Pain Assessment: 0-10  Pain Level: 2  Pain Type: Chronic pain  Pain Location: Knee  Pain Orientation: Right;Left  Pain Descriptors: Aching  Non-Pharmaceutical Pain Intervention(s): Ambulation/Increased Activity;Repositioned  Vital Signs  Patient Currently in Pain: Yes       Orientation  Orientation  Overall Orientation Status: Within Normal Limits  Social/Functional History  Social/Functional History  Lives With: Daughter, Other (comment) (disabled daughter, grandson)  Type of Home:  (condo)  Home Layout:  (third level)  Home Access: Stairs to enter with rails  Entrance Stairs - Number of Steps: 3 flights  Entrance Stairs - Rails: Left  Bathroom Shower/Tub: Walk-in shower  ADL Assistance: Independent  Homemaking Assistance: Independent  Homemaking Responsibilities: Yes  Ambulation Assistance: Independent  Transfer Assistance: Independent  Active : Yes  Occupation: Full time employment  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  Additional Comments: denies any falls  Cognition   Cognition  Overall Cognitive Status: WFL    Objective     Observation/Palpation  Posture: Good    AROM RLE (degrees)  RLE AROM: WFL  AROM LLE (degrees)  LLE AROM : WFL  Strength RLE  Strength RLE: WFL  Strength LLE  Strength LLE: WFL     Sensation  Overall Sensation Status: Impaired (intermittent pins and needles and numbness in alisha hands and feet; none at present)  Bed mobility  Supine to Sit: Modified independent (HOB elevated)  Sit to Supine: Unable to assess (up in recliner end of session)  Transfers  Sit to Stand: Independent  Stand to sit: Independent  Ambulation  Ambulation?: Yes  Ambulation 1  Surface: level tile  Device: No Device  Assistance: Independent  Quality of Gait: Pt ambulates with mild deviation of path, which pt states is baseline for her.   Gait Deviations: Deviated path  Distance: 250 ft  Stairs/Curb  Stairs?: Yes  Stairs  # Steps : 3  Stairs Height: 6\"  Rails: None  Device: No Device  Assistance: Independent  Comment: reciprocal pattern              Plan   Plan  Times per week: PT eval only  Safety Devices  Type of devices: Call light within reach, Gait belt, Left in chair, Nurse notified  Restraints  Initially in place: No  AM-PAC Score  AM-PAC Inpatient Mobility Raw Score : 24 (06/30/21 1546)  AM-PAC Inpatient T-Scale Score : 61.14 (06/30/21 1546)  Mobility Inpatient CMS 0-100% Score: 0 (06/30/21 1546)  Mobility Inpatient CMS G-Code Modifier : [facility] (06/30/21 1546)          Goals  Short term goals  Time Frame for Short term goals: 7/3/21  Short term goal 1: Pt will complete transfers independently; goal mt 6/30 - pt completes transfers independently  Short term goal 2: Pt will ambulate 150ft independently; goal met 6/30 - pt ambulates 250 ft independently  Short term goal 3: Pt will climb 3 stairs independently; goal met 6/30 - pt climbs 3 stairs independently  Patient Goals   Patient goals : \"go home\"       Therapy Time   Individual Concurrent Group Co-treatment   Time In 1516         Time Out 1540         Minutes 24         Timed Code Treatment Minutes: 14 Minutes (10 min eval)       Cele Short PT

## 2021-07-01 ENCOUNTER — APPOINTMENT (OUTPATIENT)
Dept: MRI IMAGING | Age: 56
End: 2021-07-01
Attending: HOSPITALIST
Payer: COMMERCIAL

## 2021-07-01 VITALS
OXYGEN SATURATION: 98 % | TEMPERATURE: 97.9 F | HEIGHT: 65 IN | DIASTOLIC BLOOD PRESSURE: 81 MMHG | BODY MASS INDEX: 31.65 KG/M2 | WEIGHT: 190 LBS | SYSTOLIC BLOOD PRESSURE: 119 MMHG | RESPIRATION RATE: 16 BRPM | HEART RATE: 84 BPM

## 2021-07-01 LAB
CHOLESTEROL, TOTAL: 198 MG/DL (ref 0–199)
ESTIMATED AVERAGE GLUCOSE: 119.8 MG/DL
HBA1C MFR BLD: 5.8 %
HCT VFR BLD CALC: 41.4 % (ref 36–48)
HDLC SERPL-MCNC: 44 MG/DL (ref 40–60)
HEMOGLOBIN: 13.9 G/DL (ref 12–16)
LDL CHOLESTEROL CALCULATED: 114 MG/DL
LV EF: 58 %
LVEF MODALITY: NORMAL
MCH RBC QN AUTO: 29.7 PG (ref 26–34)
MCHC RBC AUTO-ENTMCNC: 33.6 G/DL (ref 31–36)
MCV RBC AUTO: 88.3 FL (ref 80–100)
PDW BLD-RTO: 13.6 % (ref 12.4–15.4)
PLATELET # BLD: 278 K/UL (ref 135–450)
PMV BLD AUTO: 7.3 FL (ref 5–10.5)
RBC # BLD: 4.69 M/UL (ref 4–5.2)
TRIGL SERPL-MCNC: 201 MG/DL (ref 0–150)
VLDLC SERPL CALC-MCNC: 40 MG/DL
WBC # BLD: 4.8 K/UL (ref 4–11)

## 2021-07-01 PROCEDURE — 97530 THERAPEUTIC ACTIVITIES: CPT

## 2021-07-01 PROCEDURE — 6370000000 HC RX 637 (ALT 250 FOR IP): Performed by: INTERNAL MEDICINE

## 2021-07-01 PROCEDURE — 97165 OT EVAL LOW COMPLEX 30 MIN: CPT

## 2021-07-01 PROCEDURE — 94640 AIRWAY INHALATION TREATMENT: CPT

## 2021-07-01 PROCEDURE — 70551 MRI BRAIN STEM W/O DYE: CPT

## 2021-07-01 PROCEDURE — 2580000003 HC RX 258: Performed by: INTERNAL MEDICINE

## 2021-07-01 PROCEDURE — 99225 PR SBSQ OBSERVATION CARE/DAY 25 MINUTES: CPT | Performed by: PSYCHIATRY & NEUROLOGY

## 2021-07-01 PROCEDURE — 85027 COMPLETE CBC AUTOMATED: CPT

## 2021-07-01 PROCEDURE — 83036 HEMOGLOBIN GLYCOSYLATED A1C: CPT

## 2021-07-01 PROCEDURE — G0378 HOSPITAL OBSERVATION PER HR: HCPCS

## 2021-07-01 PROCEDURE — 80061 LIPID PANEL: CPT

## 2021-07-01 PROCEDURE — 96372 THER/PROPH/DIAG INJ SC/IM: CPT

## 2021-07-01 PROCEDURE — 36415 COLL VENOUS BLD VENIPUNCTURE: CPT

## 2021-07-01 PROCEDURE — 6360000004 HC RX CONTRAST MEDICATION: Performed by: INTERNAL MEDICINE

## 2021-07-01 PROCEDURE — 6360000002 HC RX W HCPCS: Performed by: INTERNAL MEDICINE

## 2021-07-01 PROCEDURE — 96374 THER/PROPH/DIAG INJ IV PUSH: CPT

## 2021-07-01 PROCEDURE — C8929 TTE W OR WO FOL WCON,DOPPLER: HCPCS

## 2021-07-01 RX ORDER — LORAZEPAM 2 MG/ML
0.5 INJECTION INTRAMUSCULAR ONCE
Status: COMPLETED | OUTPATIENT
Start: 2021-07-01 | End: 2021-07-01

## 2021-07-01 RX ORDER — ACETAMINOPHEN 325 MG/1
650 TABLET ORAL EVERY 4 HOURS PRN
Status: DISCONTINUED | OUTPATIENT
Start: 2021-07-01 | End: 2021-07-01 | Stop reason: HOSPADM

## 2021-07-01 RX ORDER — LORAZEPAM 1 MG/1
1 TABLET ORAL ONCE
Status: DISCONTINUED | OUTPATIENT
Start: 2021-07-01 | End: 2021-07-01

## 2021-07-01 RX ADMIN — Medication 2 PUFF: at 20:22

## 2021-07-01 RX ADMIN — PERFLUTREN 1.65 MG: 6.52 INJECTION, SUSPENSION INTRAVENOUS at 11:33

## 2021-07-01 RX ADMIN — Medication 10 ML: at 08:16

## 2021-07-01 RX ADMIN — ASPIRIN 81 MG: 81 TABLET, COATED ORAL at 08:16

## 2021-07-01 RX ADMIN — ATORVASTATIN CALCIUM 10 MG: 10 TABLET, FILM COATED ORAL at 08:16

## 2021-07-01 RX ADMIN — FLUTICASONE PROPIONATE 1 SPRAY: 50 SPRAY, METERED NASAL at 08:17

## 2021-07-01 RX ADMIN — ENOXAPARIN SODIUM 40 MG: 40 INJECTION SUBCUTANEOUS at 08:15

## 2021-07-01 RX ADMIN — LORAZEPAM 0.5 MG: 2 INJECTION INTRAMUSCULAR; INTRAVENOUS at 14:56

## 2021-07-01 RX ADMIN — CETIRIZINE HYDROCHLORIDE 10 MG: 10 TABLET, FILM COATED ORAL at 08:15

## 2021-07-01 RX ADMIN — TIOTROPIUM BROMIDE INHALATION SPRAY 2 PUFF: 3.12 SPRAY, METERED RESPIRATORY (INHALATION) at 08:09

## 2021-07-01 RX ADMIN — Medication 2 PUFF: at 08:08

## 2021-07-01 RX ADMIN — ACETAMINOPHEN 650 MG: 325 TABLET ORAL at 06:34

## 2021-07-01 ASSESSMENT — PAIN SCALES - GENERAL: PAINLEVEL_OUTOF10: 4

## 2021-07-01 NOTE — PROGRESS NOTES
Occupational Therapy/Evaluation/Discharge    Occupational Therapy Initial Assessment/Treatment/Discharge  x1 only   Date: 2021   Patient Name: Sisi Kruse  MRN: [id number]     : 1965    Date of Service: 2021    Discharge Recommendations:  Home with assist PRNHome c PRN A        Assessment   Performance deficits / Impairments: Decreased vision/visual deficit  Assessment: OT eval reveals 54 yo female presented c double vision. Pt reported (I) for all functional tasks prior to administration. At this time pt. reports resolved visually impairment and demo (I) c funtional tasks. PT is safe to return adan, further OT services are not recommended. Prognosis: Good  Decision Making: Low Complexity  OT Education: OT Role;Precautions; Low Vision Education  No Skilled OT: Independent with functional mobility; Independent with ADL's  REQUIRES OT FOLLOW UP: No  Activity Tolerance  Activity Tolerance: Patient Tolerated treatment well  Activity Tolerance: BP: 132/68 O2:96%  Safety Devices  Safety Devices in place: Yes  Type of devices: Left in bed;Call light within reach;Gait belt           Patient Diagnosis(es): There were no encounter diagnoses. has a past medical history of Anemia, Asthma, COPD (chronic obstructive pulmonary disease) ([facility] [address] Hiatal hernia, History of blood transfusion, Hyperlipidemia, Migraines, and Pulmonary nodule. has a past surgical history that includes hernia repair.            Restrictions  Restrictions/Precautions  Restrictions/Precautions: Up as Tolerated  Position Activity Restriction  Other position/activity restrictions: telemetry    Subjective   General  Chart Reviewed: Yes  Patient assessed for rehabilitation services?: Yes  Patient Currently in Pain: Denies  Vital Signs  Temp: 98.4 °F (36.9 °C)  Temp Source: Oral  Pulse: 83  Heart Rate Source: Monitor  Resp: 16  BP: 135/84  BP Location: Right upper arm  MAP (mmHg): 101  Patient Position: Semi fowlers  Level of Consciousness: Alert (0)  MEWS Score: 1  Patient Currently in Pain: Denies  Oxygen Therapy  SpO2: 97 %  O2 Device: None (Room air)     Disease Specific Education: Pt educated on signs and symptoms of CVA and need to seek immediate medical attention should they occur. Pt verbalized understanding    Social/Functional History  Social/Functional History  Lives With: Daughter, Other (comment)  Type of Home: House  Home Layout:  (third level)  Home Access: Stairs to enter with rails  Entrance Stairs - Number of Steps: 3 flights  Entrance Stairs - Rails: Left  Bathroom Shower/Tub: Walk-in shower  Bathroom Toilet: Standard  Bathroom Equipment:  (uses wall and shower door for A if needed)  ADL Assistance: Independent  Homemaking Assistance: Independent  Homemaking Responsibilities: Yes  Meal Prep Responsibility: Primary  Laundry Responsibility: Primary  Cleaning Responsibility: Primary  Bill Paying/Finance Responsibility: Primary  Shopping Responsibility: Primary  Dependent Care Responsibility: Primary  Health Care Management: Primary  Ambulation Assistance: Independent  Transfer Assistance: Independent  Active : Yes  Occupation: Full time employment  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 reports double vision resolved)  Hearing Exceptions: Hard of hearing/hearing concerns    Orientation  Overall Orientation Status: Within Functional Limits     Balance  Sitting Balance: Independent  Standing Balance: Independent  Functional Mobility  Functional - Mobility Device: No device  Activity: To/from bathroom  Assist Level:  Independent  Toilet Transfers  Toilet Transfer: Independent  ADL  LE Dressing: None  Toileting: Stand by assistance  Tone RUE  RUE Tone: Normotonic  Tone LUE  LUE Tone: Normotonic  Coordination  Movements Are Fluid And Coordinated: Yes     Bed mobility  Supine to Sit: Modified independent  Scooting: Independent  Transfers  Sit to stand: Independent  Stand to sit: Independent  Vision - Basic Assessment  Prior Vision: Wears glasses all the time  Cognition  Overall Cognitive Status: WFL  Perception  Overall Perceptual Status: WFL     Sensation  Overall Sensation Status: Impaired (pt reports occasional numbness and tinglining in BUE and BBE)        LUE AROM (degrees)  LUE AROM : WFL  Left Hand AROM (degrees)  Left Hand AROM: WFL  RUE AROM (degrees)  RUE AROM : WFL  Right Hand AROM (degrees)  Right Hand AROM: WFL  LUE Strength  Gross LUE Strength: WFL  L Hand General: 5/5  RUE Strength  Gross RUE Strength: WFL  R Hand General: 5/5                   Plan   Plan  Times per week: 1 time only      AM-PAC Score        AM-PAC Inpatient Daily Activity Raw Score: 24 (07/01/21 1131)  AM-PAC Inpatient ADL T-Scale Score : 57.54 (07/01/21 1131)  ADL Inpatient CMS 0-100% Score: 0 (07/01/21 1131)  ADL Inpatient CMS G-Code Modifier : [address] (07/01/21 1131)    Goals  Short term goals  Time Frame for Short term goals: one time only  Short term goal 1: pt will (I) complete toleit transfer-goal met  Short term goal 2: pt will (I) ambulate to the from toliet to sink c 0 reports of LOB- goal met       Therapy Time   Individual Concurrent Group Co-treatment   Time In 0959         Time Out 1017         Minutes 18         Timed Code Treatment Minutes: 8 Minutes (10 for eval)       CONSTANTINO Shahid/PAMELA Gipson/SELINA    Therapist/clinical instructor present during entire session to direct all aspects of assessment and treatment.

## 2021-07-01 NOTE — CONSULTS
In patient Neurology consult        [facility] Neurology      Ivonne Gomez, [address]  1965    Date of Service: 2021    Referring Physician: Siddhartha Argueta MD      Reason for the consult and CC: New onset blurred vision    HPI:   The patient is a 55y.o.  years old female with history of chronic migraine and hyperlipidemia who was admitted yesterday with the blurred vision. Symptoms started several hours prior to admission. Description was sudden onset of blurred vision with double vision lasted for several hours. Degree was moderate. No severe headache. Blurred vision was not related to specific gaze preference. Double vision was intermittently horizontally. No weakness or numbness or tingling or visual changes. No dysphagia or dysarthria. No other relieving or aggravating factors. She came to the ED where she was evaluated per initial neuro imaging showed no large vessel occlusion or acute stroke. She was admitted to [facility]. Today she feels somewhat better. MRI of the brain showed no acute stroke and possible Chiari I malformation. Blood test reviewed. A1c 5.8 and . She is currently on aspirin. Other review of system is unremarkable. Family history: Noncontributory        Past Surgical History:   Procedure Laterality Date    HERNIA REPAIR          Past Medical History:   Diagnosis Date    Anemia     Asthma     COPD (chronic obstructive pulmonary disease) ([address]     Hiatal hernia     History of blood transfusion     Hyperlipidemia     Migraines     Pulmonary nodule      Social History     Tobacco Use    Smoking status: Former Smoker     Packs/day: 1.00     Years: 35.00     Pack years: 35.00     Types: Cigarettes     Quit date: 2020     Years since quittin.4    Smokeless tobacco: Never Used   Vaping Use    Vaping Use: Former    Substances: Unknown   Substance Use Topics    Alcohol use: Yes     Comment: rarely    Drug use:  No Allergies   Allergen Reactions    Codeine Hives and Itching    Percocet [Oxycodone-Acetaminophen] Nausea And Vomiting and Rash    Vicodin [Hydrocodone-Acetaminophen] Rash     Current Facility-Administered Medications   Medication Dose Route Frequency Provider Last Rate Last Admin    acetaminophen (TYLENOL) tablet 650 mg  650 mg Oral Q4H PRN Gonzalo Tillman MD   650 mg at 07/01/21 0634    buPROPion (WELLBUTRIN XL) extended release tablet 150 mg  150 mg Oral Daily Antonio Ackerman MD        busPIRone (BUSPAR) tablet 10 mg  10 mg Oral BID Antonio Ackerman MD        cetirizine (ZYRTEC) tablet 10 mg  10 mg Oral Daily Antonio Ackerman MD   10 mg at 07/01/21 0815    fluticasone (FLONASE) 50 MCG/ACT nasal spray 1 spray  1 spray Each Nostril Daily Antonio Ackerman MD   1 spray at 07/01/21 0817    atorvastatin (LIPITOR) tablet 10 mg  10 mg Oral Daily Antonio Ackerman MD   10 mg at 07/01/21 0816    tiotropium (SPIRIVA RESPIMAT) 2.5 MCG/ACT inhaler 2 puff  2 puff Inhalation Daily Antonio Ackerman MD   2 puff at 07/01/21 0809    traZODone (DESYREL) tablet 50 mg  50 mg Oral Nightly PRN Antonio Ackerman MD        sodium chloride flush 0.9 % injection 5-40 mL  5-40 mL Intravenous 2 times per day Antonio Ackerman MD   10 mL at 07/01/21 0816    sodium chloride flush 0.9 % injection 5-40 mL  5-40 mL Intravenous PRN Antonio Ackerman MD        0.9 % sodium chloride infusion  25 mL Intravenous PRN Antonio Ackerman MD        ondansetron (ZOFRAN-ODT) disintegrating tablet 4 mg  4 mg Oral Q8H PRN Antonio Ackerman MD        Or    ondansetron (ZOFRAN) injection 4 mg  4 mg Intravenous Q6H PRN Antonio Ackerman MD        polyethylene glycol (GLYCOLAX) packet 17 g  17 g Oral Daily PRN Antonio Ackerman MD        enoxaparin (LOVENOX) injection 40 mg  40 mg Subcutaneous Daily Antonio Ackerman MD   40 mg at 07/01/21 0815    aspirin EC tablet 81 mg  81 mg Oral Daily Antonio Ackerman MD   81 mg at 07/01/21 5673 Or    aspirin suppository 300 mg  300 mg Rectal Daily Joyce Gonzalez MD        albuterol sulfate  (90 Base) MCG/ACT inhaler 2 puff  2 puff Inhalation BID Joyce Gonzalez MD   2 puff at 07/01/21 0808       ROS : A 10-14 system review of constitutional, cardiovascular, respiratory, eyes, musculoskeletal, endocrine, GI, ENT, skin, hematological, genitourinary, psychiatric and neurologic systems was obtained and updated today and is unremarkable except as mentioned in my HPI      Exam:     Constitutional:   Vitals:    07/01/21 0812 07/01/21 1129 07/01/21 1436 07/01/21 1439   BP:  135/84 125/73 125/73   Pulse:  83 91 86   Resp: 16 16 16 16   Temp:  98.4 °F (36.9 °C) 97.8 °F (36.6 °C) 97.8 °F (36.6 °C)   TempSrc:  Oral Oral Oral   SpO2: 96% 97% 95% 99%   Weight:       Height:           General appearance:  Normal development and appear in no acute distress. Eye:  Fundus of the eye: Funduscopic examination cannot be performed due to COVID19 restrictions and precautions. Neck: supple  Cardiovascular: No lower leg edema with good pulsation. Mental Status:   Oriented to person, place, problem, and time. Memory: Good immediate recall. Intact remote memory  Normal attention span and concentration. Language: intact naming, repeating and fluency   Good fund of Knowledge. Aware of current events and vocabulary   Cranial Nerves:   II: Visual fields: Full. Pupils: equal, round, reactive to light  III,IV,VI: Extra Ocular Movements are intact. No nystagmus  V: Facial sensation is intact   VII: Facial strength and movements: intact and symmetric  VIII: Hearing: Intact  IX: Palate elevation is symmetric  XI: Shoulder shrug is intact  XII: Tongue movements are normal  Musculoskeletal: 5/5 in all 4 extremities. Tone: Normal tone. Reflexes: 2+ in the upper extremity and 2+ in the lower extremity   Planters: flexor bilaterally. Coordination: no pronator drift, no dysmetria with FNF in upper extremities. Normal REM. Sensation: normal to all modalities in both arms and legs. Gait/Posture: steady gait    Data:  LABS:   Lab Results   Component Value Date     06/29/2021    K 3.5 06/29/2021     06/29/2021    CO2 27 06/29/2021    BUN 8 06/29/2021    CREATININE 0.6 06/29/2021    GFRAA >60 06/29/2021    LABGLOM >60 06/29/2021    GLUCOSE 149 06/29/2021    MG 1.90 06/29/2021    CALCIUM 9.6 06/29/2021     Lab Results   Component Value Date    WBC 4.8 07/01/2021    RBC 4.69 07/01/2021    HGB 13.9 07/01/2021    HCT 41.4 07/01/2021    MCV 88.3 07/01/2021    RDW 13.6 07/01/2021     07/01/2021     Lab Results   Component Value Date    INR 0.98 06/29/2021    PROTIME 11.4 06/29/2021       Neuroimaging were independently reviewed by me and discussed results with the patient  Reviewed notes from different physicians  Reviewed lab and blood testing    Impression:  New onset blurred vision. Improved. So far no specific etiology. Possible migraine aura. Prediabetes, new diagnosis  Hyperlipidemia  Depression    Recommendation:  Long discussion with the patient regarding MRI results. Possible incidental Chiari malformation. Follow-up with PCP regarding her A1c  Aspirin for stroke prevention  Continue home SSRI  Statin  Telemetry  DVT and GI prophylaxis  Can be discharged from neurology once medically stable  Follow-up with neurology if symptoms recur to consider starting preventative medications. Thank you for referring such patient. If you have any questions regarding my consult note, please don't hesitate to call me. Mary Borden MD  [phone]    This dictation was generated by voice recognition computer software.  Although all attempts are made to edit the dictation for accuracy, there may be errors in the  transcription that are not intended

## 2021-07-01 NOTE — DISCHARGE SUMMARY
Hospital Medicine Discharge Summary    Patient ID: Justus Jimenez      Patient's PCP: JACKSON Turpin - CNP    Admit Date: 6/30/2021     Discharge Date: 7/1/2021      Admitting Physician: Olivia Wagner MD     Discharge Physician: Olivia Wagner MD     Discharge Diagnoses: Active Hospital Problems    Diagnosis     Variants of migraine [G43.809]     Prediabetes [R73.03]     Dyslipidemia [E78.5]     Blurred vision [H53.8]        The patient was seen and examined on day of discharge and this discharge summary is in conjunction with any daily progress note from day of discharge. Hospital Course:   Blurred vision-transient- , possible migraine aura neurology input appreciated  Okay for discharge neurology standpoint    Possible incidental Chiari malformation-neurology is aware and had discussion with family  Okay for discharge     History of bronchial asthma-stable continue home inhalers     Anxiety-BuSpar and Wellbutrin          Physical Exam Performed:     /81   Pulse 84   Temp 97.9 °F (36.6 °C) (Oral)   Resp 16   Ht 5' 5\" (1.651 m)   Wt 190 lb (86.2 kg)   SpO2 98%   BMI 31.62 kg/m²       General appearance: No apparent distress, appears stated age and cooperative. HEENT: Pupils equal, round, and reactive to light. Conjunctivae/corneas clear. Neck: Supple, with full range of motion. No jugular venous distention. Trachea midline. Respiratory:  Normal respiratory effort. Clear to auscultation, bilaterally without Rales/Wheezes/Rhonchi. Cardiovascular: Regular rate and rhythm with normal S1/S2 without murmurs, rubs or gallops. Abdomen: Soft, non-tender, non-distended with normal bowel sounds. Musculoskeletal: No clubbing, cyanosis or edema bilaterally. Full range of motion without deformity. Skin: Skin color, texture, turgor normal.  No rashes or lesions. Neurologic:  Neurovascularly intact without any focal sensory/motor deficits.  Cranial nerves: II-XII intact, grossly non-focal.  Psychiatric: Alert and oriented, thought content appropriate, normal insight  Capillary Refill: Brisk,3 seconds, normal   Peripheral Pulses: +2 palpable, equal bilaterally       Labs: For convenience and continuity at follow-up the following most recent labs are provided:      CBC:    Lab Results   Component Value Date    WBC 4.8 07/01/2021    HGB 13.9 07/01/2021    HCT 41.4 07/01/2021     07/01/2021       Renal:    Lab Results   Component Value Date     06/29/2021    K 3.5 06/29/2021     06/29/2021    CO2 27 06/29/2021    BUN 8 06/29/2021    CREATININE 0.6 06/29/2021    CALCIUM 9.6 06/29/2021         Significant Diagnostic Studies    Radiology:   MRI brain without contrast   Final Result   No acute intracranial abnormality. Minimal chronic microvascular disease. Borderline low-lying of the cerebellar tonsils, may be related to borderline   Chiari 1 malformation.                 Consults:     IP CONSULT TO NEUROLOGY    Disposition: Home    Condition at Discharge: Stable    Discharge Instructions/Follow-up: PCP neurology as needed    Code Status:  Prior     Activity: activity as tolerated    Diet: diabetic diet      Discharge Medications:     Discharge Medication List as of 7/1/2021  4:03 PM           Details   aspirin 81 MG chewable tablet Take 81 mg by mouth dailyHistorical Med      buPROPion (WELLBUTRIN XL) 150 MG extended release tablet Take 150 mg by mouth every morningHistorical Med      simvastatin (ZOCOR) 20 MG tablet Historical Med      tiotropium (SPIRIVA RESPIMAT) 2.5 MCG/ACT AERS inhaler Inhale 2 puffs into the lungs daily, Disp-4 g, R-5Normal      albuterol sulfate HFA (PROAIR HFA) 108 (90 Base) MCG/ACT inhaler Inhale 2 puffs into the lungs every 6 hours as needed for Wheezing or Shortness of Breath, Disp-1 Inhaler,R-6Normal      hydrOXYzine (ATARAX) 25 MG tablet Take 25 mg by mouth 3 times dailyHistorical Med      busPIRone (BUSPAR) 10 MG tablet Take 10 mg by mouth 2 times dailyHistorical Med      fluticasone (FLONASE) 50 MCG/ACT nasal spray 1 spray by Each Nostril route dailyHistorical Med      traZODone (DESYREL) 50 MG tablet Take 50 mg by mouth nightly as needed for SleepHistorical Med      Multiple Vitamin (MULTI-VITAMIN DAILY PO) Take by mouthHistorical Med      cetirizine (ZYRTEC) 10 MG tablet Take 10 mg by mouth dailyHistorical Med      Acetaminophen (TYLENOL EXTRA STRENGTH PO) Take by mouth daily as needed Historical Med             Time Spent on discharge is   30 minutes in the examination, evaluation, counseling and review of medications and discharge plan. Signed:    Nicki Giron MD   7/13/2021      Thank you JACKSON Andrade - ALEXANDR for the opportunity to be involved in this patient's care. If you have any questions or concerns please feel free to contact me at [phone].

## 2021-07-01 NOTE — PROGRESS NOTES
Perfect serve sent to MD requesting new order for IV Ativan vs PO Ativan for MRI. Awaiting response.

## 2021-07-01 NOTE — PROGRESS NOTES
Speech Language Pathology  Consult Note    SLP received consult and completed chart review. RN reports pt is having no difficulty with speaking/swallowing, and formal evaluation is not indicated at this time. Please re-consult SLP if such need should arise in the future. Kristina Sadler, [id number] [id number]  Speech-Language Pathologist

## 2021-07-01 NOTE — PROGRESS NOTES
Hospitalist Progress Note      PCP: JACKSON Auguste CNP    Date of Admission: 6/30/2021    Chief Complaint: Blurred vision started around 5:30 PM yesterday, patient was transferred from [facility] ER for further management    Hospital Course:   55 y.o. female who presented to [facility] with above complaint. 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 She was feeling okay at that time and she started to do crocheting for some time and watch TV. Later she realized that she could not see properly and all were double. Double vision lasted more than 5 to 6 hours. She reached emergency room around 7:30 PM.  Initial work-up was negative for acute CVA and she was transferred over here for further management. Currently her symptoms completely gone and she does not have any vision changes, slurred speech, difficulty in swallowing, focal neurological symptoms over the extremities. No change in mental status fever cough shortness of breath abdominal pain nausea or vomiting. She has a history of migraine but she is not on any medication. She gets headache very seldom. This morning she experienced some pain over the eyes and middle of the head.     Subjective:  No double vision or any focal neurological symptoms      Medications:  Reviewed    Infusion Medications    sodium chloride       Scheduled Medications    LORazepam  1 mg Oral Once    buPROPion  150 mg Oral Daily    busPIRone  10 mg Oral BID    cetirizine  10 mg Oral Daily    fluticasone  1 spray Each Nostril Daily    atorvastatin  10 mg Oral Daily    tiotropium  2 puff Inhalation Daily    sodium chloride flush  5-40 mL Intravenous 2 times per day    enoxaparin  40 mg Subcutaneous Daily    aspirin  81 mg Oral Daily    Or    aspirin  300 mg Rectal Daily    albuterol sulfate HFA  2 puff Inhalation BID     PRN Meds: acetaminophen, traZODone, sodium chloride flush, sodium chloride, ondansetron **OR** ondansetron, polyethylene glycol, perflutren lipid microspheres    No intake or output data in the 24 hours ending 07/01/21 0937    Physical Exam Performed:    /68   Pulse 94   Temp 97.6 °F (36.4 °C) (Oral)   Resp 16   Ht 5' 5\" (1.651 m)   Wt 190 lb (86.2 kg)   SpO2 96%   BMI 31.62 kg/m²     General appearance: No apparent distress, appears stated age and cooperative. HEENT: Pupils equal, round, and reactive to light. Conjunctivae/corneas clear. Neck: Supple, with full range of motion. No jugular venous distention. Trachea midline. Respiratory:  Normal respiratory effort. Clear to auscultation, bilaterally without Rales/Wheezes/Rhonchi. Cardiovascular: Regular rate and rhythm with normal S1/S2 without murmurs, rubs or gallops. Abdomen: Soft, non-tender, non-distended with normal bowel sounds. Musculoskeletal: No clubbing, cyanosis or edema bilaterally. Full range of motion without deformity. Skin: Skin color, texture, turgor normal.  No rashes or lesions. Neurologic:  Neurovascularly intact without any focal sensory/motor deficits.  Cranial nerves: II-XII intact, grossly non-focal.  Psychiatric: Alert and oriented, thought content appropriate, normal insight  Capillary Refill: Brisk,3 seconds, normal   Peripheral Pulses: +2 palpable, equal bilaterally       Labs:   Recent Labs     06/29/21 2004 07/01/21  0604   WBC 5.0 4.8   HGB 14.1 13.9   HCT 42.1 41.4    278     Recent Labs     06/29/21 2004      K 3.5      CO2 27   BUN 8   CREATININE 0.6   CALCIUM 9.6     Recent Labs     06/29/21 2004   AST 21   ALT 21   BILITOT <0.2   ALKPHOS 59     Recent Labs     06/29/21 2004   INR 0.98     Recent Labs     06/29/21 2004 06/30/21  1429 06/30/21  1730   TROPONINI <0.01 <0.01 <0.01       Urinalysis:      Lab Results   Component Value Date    NITRU Negative 05/14/2012    WBCUA 3-5 05/14/2012    BACTERIA Many 05/14/2012    BLOODU Negative 05/14/2012    SPECGRAV 1.020 05/14/2012    GLUCOSEU Negative 05/14/2012       Radiology:  MRI brain without contrast    (Results Pending)           Assessment/Plan:    Active Hospital Problems    Diagnosis     Blurred vision [H53.8]      Blurred vision-transient- , neurochecks, monitor vitals, aspirin, statin, MRI of the brain, echocardiogram, neurology evaluation, PT OT and speech     History of bronchial asthma-stable continue home inhalers     Anxiety-BuSpar and Wellbutrin    DVT Prophylaxis: Lovenox  Diet: ADULT DIET;  Regular  Code Status: Full Code    PT/OT Eval Status: Input appreciated    Dispo -today if stroke work-up negative and okay with neurology    Paulino Early MD

## 2021-07-02 NOTE — PROGRESS NOTES
Pt discharged home. Medications placed in lockbox were returned to patient. Daughter provided transport.

## 2021-07-02 NOTE — ED PROVIDER NOTES
I independently examined and evaluated 55Ferny Flores. In brief, Micah Flores is a 55 y.o. female with a past medical history of hyperlipidemia, prediabetes, migraine, who presents to the ED complaining of blurry vision. Acute onset of symptoms at 1630 today. Patient stated that she was crocheting and she had sudden blurriness of her vision in her bilateral eyes. She denied double vision. She clean her glasses without relief. She denies palliative or provocative factors. She denies eye pain, sparkles, blackening of vision. She denies headache, numbness, weakness, tingling, or slurred speech. She denies fever. She denies head trauma or blood thinners. Patient does report that her symptoms are improving while in the emergency department. Focused exam revealed   PHYSICAL EXAM  BP (!) 144/85   Pulse 94   Temp 99.1 °F (37.3 °C) (Oral)   Resp 16   Ht 5' 5\" (1.651 m)   Wt 190 lb (86.2 kg)   SpO2 99%   BMI 31.62 kg/m²    GENERAL APPEARANCE: Awake and alert. Cooperative. No distress. HENT: Normocephalic. Atraumatic. Mucous membranes are moist  NECK: Supple. Full range of motion of the neck without stiffness or pain. EYES: PERRL. EOM's intact. Visual fields are intact. No evidence of hyphema. Ocular pressures completed by midlevel provider and were within normal limits. Vision 20/20 bilaterally. Negative fluorescein staining bilaterally, no evidence of corneal ulcer or abrasion. HEART/CHEST: RRR. No murmurs. Chest wall is not tender to palpation. LUNGS: Respirations unlabored. CTAB. Good air exchange. Speaking comfortably in full sentences. ABDOMEN: No tenderness. Soft. Non-distended. No masses. No organomegaly. No guarding or rebound. MUSCULOSKELETAL: No extremity edema. Compartments soft. No deformity. No tenderness in the extremities. All extremities neurovascularly intact. SKIN: Warm and dry. No acute rashes. NEUROLOGICAL: Alert and oriented. CN's 2-12 intact. No gross facial drooping.  Strength 5/5, sensation intact. NIH stroke scale of 0. GCS 15. PSYCHIATRIC: Anxious. ED course / MDM:   Overall well appearing patient, in no acute distress, presenting for bilateral blurry vision. History of present illness significant for acute onset. Physical exam remarkable for NIH stroke scale of 0, no abnormalities noted on ocular exam.     Differential diagnosis includes but is not limited to: CVA, TIA, dry eye, eye muscle strain, glaucoma, anxiety, migraine with aura, lower suspicion for retinal detachment, hyphema, meningitis    Patient reporting symptoms in her bilateral eyes. While her vision was intact on visual acuity, patient endorses that her vision is blurry compared to baseline in each eye separately. She denies double vision. She reports symptoms are present in each eye when she walks all vision from the other eye. Given bilateral vision changes, higher concern for a central process rather than an ophthalmological process. Labs, EKG, and imaging obtained. Work-up showed:    XR CHEST PORTABLE   Final Result   1. No acute disease. 2. Stable nodule in the right lower lobe. CTA HEAD NECK W CONTRAST   Final Result   No significant arterial stenosis in the head and neck. CT HEAD WO CONTRAST   Final Result   No acute intracranial abnormality. 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 Critical results were called by Dr. Haile Ruiz to Angie Woods NP on   6/29/2021 at 20:37. CT scan showed no evidence of intracranial hemorrhage, mass, normal pressure hydrocephalus, large vessel occlusion. It did show a peripheral calcified cyst in the region of the pineal gland. The Ekg interpreted by me shows  normal sinus rhythm with a rate of 91  Axis is   Normal  QTc is  within an acceptable range  Intervals and Durations are unremarkable.       ST Segments: normal  No significant change from prior EKG dated 2/4/20    ED Course as of Jul 02 2250   Tue Jun 29, 2021 2032 Patient Is not hypoglycemic.    [ER]   2033 Coagulation studies within normal limits. [ER]   2033 No leukocytosis, anemia, thrombocytopenia. [ER]   2154 Ocular pressure normal    [ER]   2222 Troponin within normal limits. [ER]   7823 The Ekg interpreted by me shows  normal sinus rhythm with a rate of 91  Axis is   Normal  QTc is  within an acceptable range  Intervals and Durations are unremarkable. ST Segments: normal  No significant change from prior EKG dated 2/4/20    [ER]   2224 No Significant electrolyte abnormalities or evidence of kidney dysfunction.    [ER]   2224 Liver function testing unremarkable. [ER]      ED Course User Index  [ER] Roel Maxwell MD     Neurology and ophthalmology consulted. NIH stroke scale of 0. Stroke team did not feel that TPA was indicated. Both the stroke team and neurology recommended follow-up with neuro-ophthalmology and did feel that an MRI would be indicated including the brain and including single cuts through the orbits. Ophthalmology also consulted, they had overall lower suspicion for an ophthalmologic issue. I agree with this assessment given the patient's symptoms are bilateral. They did not feel that the patient required evaluation by an ophthalmologist overnight. They stated they can see the patient on outpatient basis tomorrow if she was discharged. [facility] does not have any beds available. Midlevel provider discussed options with patient. We discussed admission for MRI versus discharge to follow-up with ophthalmology tomorrow. Patient's preference is for admission at this time. Patient transferred to [facility] for MRI and further neurology consultation as needed. Patient can follow-up with ophthalmology on an outpatient basis. CLINICAL IMPRESSION  1.  Blurry vision, bilateral        Blood pressure (!) 144/85, pulse 94, temperature 99.1 °F (37.3 °C), temperature source Oral, resp. rate 16, height 5' 5\" (1.651 m), weight 190 lb (86.2 kg), SpO2 99 %. DISPOSITION  Helga Pacheco was transferred to [facility] in stable condition. All diagnostic, treatment, and disposition decisions were made by myself in conjunction with the advanced practice provider. For all further details of the patient's emergency department visit, please see the advanced practice provider's documentation. Comment: Please note this report has been produced using speech recognition software and may contain errors related to that system including errors in grammar, punctuation, and spelling, as well as words and phrases that may be inappropriate. If there are any questions or concerns please feel free to contact the dictating provider for clarification.         Gracie Nieto MD  07/02/21 [id number]

## 2021-08-15 ENCOUNTER — HOSPITAL ENCOUNTER (OUTPATIENT)
Dept: CT IMAGING | Age: 56
Discharge: HOME | End: 2021-08-15
Payer: COMMERCIAL

## 2021-08-15 DIAGNOSIS — R91.1 LUNG NODULE: ICD-10-CM

## 2021-08-15 DIAGNOSIS — J44.9 CHRONIC OBSTRUCTIVE PULMONARY DISEASE, UNSPECIFIED COPD TYPE (HCC): ICD-10-CM

## 2021-08-15 DIAGNOSIS — Z87.891 PERSONAL HISTORY OF TOBACCO USE, PRESENTING HAZARDS TO HEALTH: ICD-10-CM

## 2021-08-15 PROCEDURE — 71250 CT THORAX DX C-: CPT

## 2021-08-19 ENCOUNTER — OFFICE VISIT (OUTPATIENT)
Dept: PULMONOLOGY | Age: 56
End: 2021-08-19
Payer: COMMERCIAL

## 2021-08-19 VITALS
HEART RATE: 88 BPM | WEIGHT: 187.5 LBS | BODY MASS INDEX: 32.01 KG/M2 | HEIGHT: 64 IN | OXYGEN SATURATION: 97 % | TEMPERATURE: 96 F | RESPIRATION RATE: 18 BRPM | DIASTOLIC BLOOD PRESSURE: 68 MMHG | SYSTOLIC BLOOD PRESSURE: 122 MMHG

## 2021-08-19 DIAGNOSIS — R91.1 PULMONARY NODULE: Primary | ICD-10-CM

## 2021-08-19 DIAGNOSIS — J30.2 SEASONAL ALLERGIES: ICD-10-CM

## 2021-08-19 DIAGNOSIS — J44.9 COPD, MILD (HCC): ICD-10-CM

## 2021-08-19 PROCEDURE — G8427 DOCREV CUR MEDS BY ELIG CLIN: HCPCS | Performed by: INTERNAL MEDICINE

## 2021-08-19 PROCEDURE — 99214 OFFICE O/P EST MOD 30 MIN: CPT | Performed by: INTERNAL MEDICINE

## 2021-08-19 PROCEDURE — 3017F COLORECTAL CA SCREEN DOC REV: CPT | Performed by: INTERNAL MEDICINE

## 2021-08-19 PROCEDURE — G8926 SPIRO NO PERF OR DOC: HCPCS | Performed by: INTERNAL MEDICINE

## 2021-08-19 PROCEDURE — 3023F SPIROM DOC REV: CPT | Performed by: INTERNAL MEDICINE

## 2021-08-19 PROCEDURE — 1036F TOBACCO NON-USER: CPT | Performed by: INTERNAL MEDICINE

## 2021-08-19 PROCEDURE — G8417 CALC BMI ABV UP PARAM F/U: HCPCS | Performed by: INTERNAL MEDICINE

## 2021-08-19 RX ORDER — BUDESONIDE AND FORMOTEROL FUMARATE DIHYDRATE 160; 4.5 UG/1; UG/1
2 AEROSOL RESPIRATORY (INHALATION) 2 TIMES DAILY
Qty: 1 INHALER | Refills: 5 | Status: SHIPPED | OUTPATIENT
Start: 2021-08-19 | End: 2022-02-16

## 2021-08-19 RX ORDER — MONTELUKAST SODIUM 10 MG/1
10 TABLET ORAL EVERY EVENING
Qty: 30 TABLET | Refills: 5 | Status: SHIPPED | OUTPATIENT
Start: 2021-08-19 | End: 2022-02-16

## 2021-08-19 NOTE — PROGRESS NOTES
[facility]                                                        CHIEF COMPLAINT: Follow-up CT chest         HPI:   CT chest reviewed by me and noted below. Results were dicussed with patient and multiple good questions were answered. Allergy worse with SOB cough and sneezing   Uses Spiriva daily  Uses Albuterol once a day       Past Medical History:   Diagnosis Date    Anemia     Asthma     COPD (chronic obstructive pulmonary disease) (HCC)     Hiatal hernia     History of blood transfusion     Hyperlipidemia     Migraines     Pulmonary nodule        Past Surgical History:        Procedure Laterality Date    HERNIA REPAIR         Allergies:  is allergic to codeine, percocet [oxycodone-acetaminophen], and vicodin [hydrocodone-acetaminophen]. Social History:    TOBACCO:   reports that she quit smoking about 19 months ago. Her smoking use included cigarettes. She has a 35.00 pack-year smoking history. She has never used smokeless tobacco.  ETOH:   reports current alcohol use.       Family History:   No lung cancer       Current Medications:    Current Outpatient Medications:     aspirin 81 MG chewable tablet, Take 81 mg by mouth daily, Disp: , Rfl:     buPROPion (WELLBUTRIN XL) 150 MG extended release tablet, Take 150 mg by mouth every morning, Disp: , Rfl:     simvastatin (ZOCOR) 20 MG tablet, , Disp: , Rfl:     tiotropium (SPIRIVA RESPIMAT) 2.5 MCG/ACT AERS inhaler, Inhale 2 puffs into the lungs daily, Disp: 4 g, Rfl: 5    albuterol sulfate HFA (PROAIR HFA) 108 (90 Base) MCG/ACT inhaler, Inhale 2 puffs into the lungs every 6 hours as needed for Wheezing or Shortness of Breath, Disp: 1 Inhaler, Rfl: 6    hydrOXYzine (ATARAX) 25 MG tablet, Take 25 mg by mouth 3 times daily, Disp: , Rfl:     busPIRone (BUSPAR) 10 MG tablet, Take 10 mg by mouth 2 times daily, Disp: , Rfl:     fluticasone (FLONASE) 50 MCG/ACT nasal spray, 1 spray by Each Nostril route daily, Disp: , Rfl:     traZODone (DESYREL) 50 MG tablet, Take 50 mg by mouth nightly as needed for Sleep, Disp: , Rfl:     Multiple Vitamin (MULTI-VITAMIN DAILY PO), Take by mouth, Disp: , Rfl:     cetirizine (ZYRTEC) 10 MG tablet, Take 10 mg by mouth daily, Disp: , Rfl:     Acetaminophen (TYLENOL EXTRA STRENGTH PO), Take by mouth daily as needed , Disp: , Rfl:           Objective:   PHYSICAL EXAM:    /68   Pulse 88   Temp 96 °F (35.6 °C)   Resp 18   Ht 5' 4\" (1.626 m)   Wt 187 lb 8 oz (85 kg)   SpO2 97% Comment: with RA  BMI 32.18 kg/m²   Gen: No distress. Eyes: PERRL. No sclera icterus. No conjunctival injection. ENT: No discharge. Pharynx clear. Neck: Trachea midline. No obvious mass. Resp: No accessory muscle use. No crackles. No wheezes. No rhonchi. No dullness on percussion. Good air entry. CV: Regular rate. Regular rhythm. No murmur or rub. No edema. GI: Non-tender. Non-distended. No hernia. Skin: Warm and dry. No nodule on exposed extremities. Lymph: No cervical LAD. No supraclavicular LAD. M/S: No cyanosis. No joint deformity. No clubbing. Neuro: Awake. Alert. Moves all four extremities. Psych: Oriented x 3. No anxiety. DATA reviewed by me:   PFTs 01/27/2020 FVC 3.71(103%) FEV1 2.54(90%) FEV1/FVC 69% TLC 5.72(105%) DLCO 18.26 (74%) 6MW 1140 F LO2 97%     CT chest 1/22/2020  Mediastinum: Limited evaluation without IV contrast.  No mediastinal  adenopathy is appreciated. Central airways appear patent. Lungs/pleura: Mild scattered emphysematous changes are noted. Innumerable  small cysts are seen scattered in the lung parenchyma bilaterally. No  pleural effusion or focal intrapulmonary dense consolidative process. Within  the right lower lobe there is a noncalcified round pulmonary nodule which  measures 1.1 x 1.2 x 1.2 cm, series 3, image 71. Upper Abdomen: Surgical changes noted at the EG junction.   No acute finding  appreciated. Soft Tissues/Bones: No axillary adenopathy appreciated. No suspicious bony  lesion appreciated. PET scan 1/31/2020 imaging reviewed by me and showed  Mild metabolic activity in the right lower lobe nodule, SUV 2.95    CT chest 5/15/2020 imaging reviewed by me and showed  Stable 1.2 cm right lower lobe nodule    CT chest 8/14/2020 imaging reviewed by me and showed  Stable RLL nodule    CT chest 2/15/2021 imaging reviewed by me and showed  Stable right lower lobe nodule    CT chest 8/15/2021 imaging reviewed by me and showed  Stable right lower lobe nodule      Vascular endothelial G factor 49    Assessment:       · RLL 1.2 cm lung nodule stable since 1/22/2020. Most recent CT 8/15/2021. Mild activity on PET scan 1/31/2020 with SUV of 2.95. Evaluated by CT surgery. DDx granuloma and slow-growing malignancy. · Cystic lung disease. DDx [facility] and [facility].  Vascular endothelial growth factor D  · Mild COPD with possible asthma component - not controlled   · Seasonal allergy- trees, plants, mold, yeast, dust mites, cat  · 35-pack-year history of smoking- quit Jan 2020       Plan:       Options of Bx, resection and watchful waiting were discussed with patient.   I recommend to continue to follow-up radiographically given stability, CT without contrast August 2022  D/C   Albuterol 2 puffs Q4-6 hrs PRN  Continue allegra   Trial of Symbicort 160/4.5 2 puffs BID   Trial of Singulair 10 mg PO once dose in the evening   Patient was educated about optimal inhaler technique and use  Patient is up to date with Covid vaccine, Pneumococcal vaccine and Influenza  Advised to continue with smoking cessation  Follow up in 6 months or sooner if needed

## 2021-08-26 RX ORDER — TIOTROPIUM BROMIDE INHALATION SPRAY 3.12 UG/1
SPRAY, METERED RESPIRATORY (INHALATION)
Qty: 4 G | Refills: 5 | Status: SHIPPED | OUTPATIENT
Start: 2021-08-26 | End: 2021-08-30

## 2021-08-27 ENCOUNTER — TELEPHONE (OUTPATIENT)
Dept: PULMONOLOGY | Age: 56
End: 2021-08-27

## 2021-09-14 RX ORDER — ALBUTEROL SULFATE 90 UG/1
AEROSOL, METERED RESPIRATORY (INHALATION)
Qty: 18 G | Refills: 5 | Status: SHIPPED | OUTPATIENT
Start: 2021-09-14 | End: 2022-03-08 | Stop reason: SDUPTHER

## 2021-10-23 ENCOUNTER — HOSPITAL ENCOUNTER (OUTPATIENT)
Dept: MAMMOGRAPHY | Age: 56
Discharge: HOME | End: 2021-10-23
Payer: COMMERCIAL

## 2021-10-23 DIAGNOSIS — Z12.39 SCREENING BREAST EXAMINATION: ICD-10-CM

## 2021-10-23 PROCEDURE — 77067 SCR MAMMO BI INCL CAD: CPT

## 2021-10-25 ENCOUNTER — TELEPHONE (OUTPATIENT)
Dept: MAMMOGRAPHY | Age: 56
End: 2021-10-25

## 2021-10-26 NOTE — TELEPHONE ENCOUNTER
Pt called asking if she was to stop taking the Spiriva when she started the Symbicort? Pt also notes that she is still coughing with the Symbicort. Pt stopped Spiriva the day she picked up Symbicort script. Please advise. OV 8/19/21:    Assessment:       · RLL 1.2 cm lung nodule stable since 1/22/2020. Most recent CT 8/15/2021. Mild activity on PET scan 1/31/2020 with SUV of 2.95. Evaluated by CT surgery. DDx granuloma and slow-growing malignancy. · Cystic lung disease. DDx [facility] and [facility].  Vascular endothelial growth factor D  · Mild COPD with possible asthma component - not controlled   · Seasonal allergy- trees, plants, mold, yeast, dust mites, cat  · 35-pack-year history of smoking- quit Jan 2020       Plan:       · Options of Bx, resection and watchful waiting were discussed with patient.   I recommend to continue to follow-up radiographically given stability, CT without contrast August 2022  · D/C   · Albuterol 2 puffs Q4-6 hrs PRN  · Continue allegra   · Trial of Symbicort 160/4.5 2 puffs BID   · Trial of Singulair 10 mg PO once dose in the evening   · Patient was educated about optimal inhaler technique and use  · Patient is up to date with Covid vaccine, Pneumococcal vaccine and Influenza  · Advised to continue with smoking cessation  · Follow up in 6 months or sooner if needed     Pt is overdue for BMP which was ordered in June. Checked Care Everywhere also and no results in that system.     Called pt: LM for callback.     Orders have been extended for one month.

## 2022-01-14 ENCOUNTER — TELEPHONE (OUTPATIENT)
Dept: PULMONOLOGY | Age: 57
End: 2022-01-14

## 2022-02-16 DIAGNOSIS — J44.9 COPD, MILD (HCC): ICD-10-CM

## 2022-02-16 DIAGNOSIS — J30.2 SEASONAL ALLERGIES: ICD-10-CM

## 2022-02-16 RX ORDER — BUDESONIDE AND FORMOTEROL FUMARATE DIHYDRATE 160; 4.5 UG/1; UG/1
AEROSOL RESPIRATORY (INHALATION)
Qty: 10.2 G | Refills: 5 | Status: SHIPPED | OUTPATIENT
Start: 2022-02-16 | End: 2022-09-07

## 2022-02-16 RX ORDER — MONTELUKAST SODIUM 10 MG/1
TABLET ORAL
Qty: 30 TABLET | Refills: 5 | Status: SHIPPED | OUTPATIENT
Start: 2022-02-16 | End: 2022-08-24

## 2022-03-08 ENCOUNTER — TELEMEDICINE (OUTPATIENT)
Dept: PULMONOLOGY | Age: 57
End: 2022-03-08
Payer: COMMERCIAL

## 2022-03-08 DIAGNOSIS — J30.9 ALLERGIC RHINITIS, UNSPECIFIED SEASONALITY, UNSPECIFIED TRIGGER: ICD-10-CM

## 2022-03-08 DIAGNOSIS — J44.9 COPD, MILD (HCC): Primary | ICD-10-CM

## 2022-03-08 DIAGNOSIS — J30.2 SEASONAL ALLERGIES: ICD-10-CM

## 2022-03-08 PROCEDURE — 99442 PR PHYS/QHP TELEPHONE EVALUATION 11-20 MIN: CPT | Performed by: INTERNAL MEDICINE

## 2022-03-08 RX ORDER — FEXOFENADINE HCL 180 MG/1
TABLET ORAL
COMMUNITY
Start: 2022-01-14

## 2022-03-08 RX ORDER — ALBUTEROL SULFATE 90 UG/1
AEROSOL, METERED RESPIRATORY (INHALATION)
Qty: 18 G | Refills: 5 | Status: SHIPPED | OUTPATIENT
Start: 2022-03-08

## 2022-03-08 RX ORDER — AZELASTINE HYDROCHLORIDE, FLUTICASONE PROPIONATE 137; 50 UG/1; UG/1
1 SPRAY, METERED NASAL 2 TIMES DAILY
Qty: 23 G | Refills: 3 | Status: SHIPPED
Start: 2022-03-08 | End: 2022-03-22 | Stop reason: CLARIF

## 2022-03-08 NOTE — PROGRESS NOTES
[facility]                                                  TELEHEALTH EVALUATION: Service performed was Audio (During [facility]-22 public health emergency) and not a face-to-face visit         CHIEF COMPLAINT: Follow-up COPD        HPI:   Still with PND, runny nose, itchy nose - Uses Flonase BID, Allegra in am and Singulair night time   Uses Symbicort BID- feels better with it   Uses Albuterol once a day   No smoking   Has 2 cats and daughter has 2 cat  No birds     Past Medical History:   Diagnosis Date    Anemia     Asthma     COPD (chronic obstructive pulmonary disease) ([address]     Hiatal hernia     History of blood transfusion     Hyperlipidemia     Migraines     Pulmonary nodule        Past Surgical History:        Procedure Laterality Date    HERNIA REPAIR         Allergies:  is allergic to codeine, percocet [oxycodone-acetaminophen], and vicodin [hydrocodone-acetaminophen]. Social History:    TOBACCO:   reports that she quit smoking about 2 years ago. Her smoking use included cigarettes. She has a 35.00 pack-year smoking history. She has never used smokeless tobacco.  ETOH:   reports current alcohol use.       Family History:   No lung cancer       Current Medications:    Current Outpatient Medications:     fexofenadine (ALLEGRA) 180 MG tablet, , Disp: , Rfl:     SYMBICORT 160-4.5 MCG/ACT AERO, INHALE TWO PUFFS BY MOUTH TWICE A DAY, Disp: 10.2 g, Rfl: 5    montelukast (SINGULAIR) 10 MG tablet, TAKE ONE TABLET BY MOUTH EVERY EVENING, Disp: 30 tablet, Rfl: 5    albuterol sulfate  (90 Base) MCG/ACT inhaler, INHALE TWO PUFFS BY MOUTH EVERY 6 HOURS AS NEEDED FOR WHEEZING OR FOR SHORTNESS OF BREATH, Disp: 18 g, Rfl: 5    aspirin 81 MG chewable tablet, Take 81 mg by mouth daily, Disp: , Rfl:     simvastatin (ZOCOR) 20 MG tablet, , Disp: , Rfl:     fluticasone (FLONASE) 50 MCG/ACT nasal spray, 1 spray by Each Nostril route daily, Disp: , Rfl:     traZODone (DESYREL) 50 MG tablet, Take 50 mg by mouth nightly as needed for Sleep, Disp: , Rfl:     Multiple Vitamin (MULTI-VITAMIN DAILY PO), Take by mouth, Disp: , Rfl:     Acetaminophen (TYLENOL EXTRA STRENGTH PO), Take by mouth daily as needed , Disp: , Rfl:           Objective:   PHYSICAL EXAM:    Telephone visit not able to obtain physical exam               DATA reviewed by me:   PFTs 01/27/2020 FVC 3.71(103%) FEV1 2.54(90%) FEV1/FVC 69% TLC 5.72(105%) DLCO 18.26 (74%) 6MW 1140 F LO2 97%     CT chest 1/22/2020  Mediastinum: Limited evaluation without IV contrast.  No mediastinal  adenopathy is appreciated. Central airways appear patent. Lungs/pleura: Mild scattered emphysematous changes are noted. Innumerable  small cysts are seen scattered in the lung parenchyma bilaterally. No  pleural effusion or focal intrapulmonary dense consolidative process. Within  the right lower lobe there is a noncalcified round pulmonary nodule which  measures 1.1 x 1.2 x 1.2 cm, series 3, image 71. Upper Abdomen: Surgical changes noted at the EG junction. No acute finding  appreciated. Soft Tissues/Bones: No axillary adenopathy appreciated. No suspicious bony  lesion appreciated. PET scan 1/31/2020 imaging reviewed by me and showed  Mild metabolic activity in the right lower lobe nodule, SUV 2.95    CT chest 5/15/2020 imaging reviewed by me and showed  Stable 1.2 cm right lower lobe nodule    CT chest 8/14/2020 imaging reviewed by me and showed  Stable RLL nodule    CT chest 2/15/2021 imaging reviewed by me and showed  Stable right lower lobe nodule    CT chest 8/15/2021 imaging reviewed by me and showed  Stable right lower lobe nodule      Vascular endothelial G factor 49    Assessment:       · Mild COPD with possible asthma component   · Allergic rhinitis   · Cystic lung disease. DDx [facility] and [facility].  Vascular endothelial growth factor D  · RLL 1.2 cm lung nodule stable since 1/22/2020.   Most recent CT 8/15/2021. Mild activity on PET scan 1/31/2020 with SUV of 2.95. Evaluated by CT surgery. DDx granuloma and slow-growing malignancy. · Seasonal allergy- trees, plants, mold, yeast, dust mites, cat  · 35-pack-year history of smoking- quit Jan 2020       Plan:       CT chest as planned August 2022  Continue Symbicort 160/4.5 2 puffs BID   Continue Singulair 10 mg PO once dose in the evening   Albuterol 2 puffs Q4-6 hrs PRN  D/C Flonase  Trial of Dymista 1 spray/nostril BID   IgE and immunocap   Avoid cat exposure  Patient is up to date with Covid vaccine, Pneumococcal vaccine and Influenza  Advised to continue with smoking cessation  Follow up in 6 months or sooner if needed         Joey Mojica is a 55 y.o. female evaluated via telephone on 3/8/2022. Consent:  She and/or health care decision maker is aware that that she may receive a bill for this telephone service, depending on her insurance coverage, and has provided verbal consent to proceed: Yes       Documentation:  I communicated with the patient and/or health care decision maker about: See above   Details of this discussion including any medical advice provided: See above       I Affirm this is a Patient Initiated Episode with an Established Patient who has not had a related appointment within my department in the past 7 days or scheduled within the next 24 hours.     Total Time: 11-20 minutes     Note: not billable if this call serves to triage the patient into an appointment for the relevant concern      Jose Dean MD

## 2022-03-09 ENCOUNTER — TELEPHONE (OUTPATIENT)
Dept: PULMONOLOGY | Age: 57
End: 2022-03-09

## 2022-03-22 RX ORDER — AZELASTINE 1 MG/ML
2 SPRAY, METERED NASAL 2 TIMES DAILY PRN
Qty: 60 ML | Refills: 5 | Status: SHIPPED | OUTPATIENT
Start: 2022-03-22

## 2022-03-22 NOTE — TELEPHONE ENCOUNTER
Spoke with pt and informed of Dr. Carmen Hale response with verbal understanding. Script pending. Med list was updated.

## 2022-04-02 ENCOUNTER — HOSPITAL ENCOUNTER (OUTPATIENT)
Age: 57
Discharge: HOME | End: 2022-04-02
Payer: COMMERCIAL

## 2022-04-02 DIAGNOSIS — J30.2 SEASONAL ALLERGIES: ICD-10-CM

## 2022-04-02 PROCEDURE — 86003 ALLG SPEC IGE CRUDE XTRC EA: CPT

## 2022-04-02 PROCEDURE — 82785 ASSAY OF IGE: CPT

## 2022-04-04 LAB — IGE: 66 KU/L

## 2022-04-12 LAB
2000687N OAK TREE IGE: <0.1 KU/L (ref 0–0.34)
ALLERGEN BARLEY IGE: <0.1 KU/L
ALLERGEN BEEF: <0.1 KU/L
ALLERGEN BERMUDA GRASS IGE: <0.1 KU/L (ref 0–0.34)
ALLERGEN BIRCH IGE: <0.1 KU/L (ref 0–0.34)
ALLERGEN CABBAGE IGE: <0.1 KU/L
ALLERGEN CARROT IGE: <0.1 KU/L
ALLERGEN CHICKEN IGE: <0.1 KU/L
ALLERGEN CODFISH IGE: <0.1 KU/L
ALLERGEN CORN IGE: <0.1 KU/L
ALLERGEN COW MILK IGE: <0.1 KU/L
ALLERGEN CRAB IGE: <0.1 KU/L
ALLERGEN DOG DANDER IGE: <0.1 KU/L (ref 0–0.34)
ALLERGEN EGG WHITE IGE: <0.1 KU/L
ALLERGEN GERMAN COCKROACH IGE: <0.1 KU/L (ref 0–0.34)
ALLERGEN GRAPE IGE: <0.1 KU/L
ALLERGEN HORMODENDRUM IGE: <0.1 KUL/L (ref 0–0.34)
ALLERGEN LETTUCE IGE: <0.1 KU/L
ALLERGEN MOUSE EPITHELIA IGE: <0.1 KU/L (ref 0–0.34)
ALLERGEN NAVY BEAN: <0.1 KU/L
ALLERGEN OAT: <0.1 KU/L
ALLERGEN ORANGE IGE: <0.1 KU/L
ALLERGEN PEANUT (F13) IGE: <0.1 KU/L
ALLERGEN PECAN TREE IGE: <0.1 KU/L (ref 0–0.34)
ALLERGEN PEPPER C. ANNUUM IGE: <0.1 KU/L
ALLERGEN PIGWEED ROUGH IGE: <0.1 KU/L (ref 0–0.34)
ALLERGEN PORK: <0.1 KU/L
ALLERGEN RICE IGE: <0.1 KU/L
ALLERGEN RYE IGE: <0.1 KU/L
ALLERGEN SHEEP SORREL (W18) IGE: <0.1 KU/L (ref 0–0.34)
ALLERGEN SOYBEAN IGE: <0.1 KU/L
ALLERGEN TOMATO IGE: <0.1 KU/L
ALLERGEN TREE SYCAMORE: <0.1 KU/L (ref 0–0.34)
ALLERGEN TUNA IGE: <0.1 KU/L
ALLERGEN WALNUT TREE IGE: <0.1 KU/L (ref 0–0.34)
ALLERGEN WHEAT IGE: <0.1 KU/L
ALLERGEN WHITE MULBERRY TREE, IGE: <0.1 KU/L (ref 0–0.34)
ALLERGEN, TREE, WHITE ASH IGE: <0.1 KU/L (ref 0–0.34)
ALTERNARIA ALTERNATA: <0.1 KU/L (ref 0–0.34)
ASPERGILLUS FUMIGATUS: <0.1 KU/L (ref 0–0.34)
CAT DANDER ANTIBODY: <0.1 KU/L (ref 0–0.34)
COTTONWOOD TREE: <0.1 KU/L (ref 0–0.34)
D. FARINAE: <0.1 KU/L (ref 0–0.34)
D. PTERONYSSINUS: <0.1 KU/L (ref 0–0.34)
ELM TREE: <0.1 KU/L (ref 0–0.34)
IGE: 71 IU/ML
IGE: 77 IU/ML
MAPLE/BOXELDER TREE: <0.1 KU/L (ref 0–0.34)
MOUNTAIN CEDAR TREE: <0.1 KU/L (ref 0–0.34)
MUCOR RACEMOSUS: <0.1 KU/L (ref 0–0.34)
P. NOTATUM: <0.1 KU/L (ref 0–0.34)
POTATO, IGE: <0.1 KU/L
RUSSIAN THISTLE: <0.1 KU/L (ref 0–0.34)
SHORT RAGWD(A ARTEMIS.) IGE: <0.1 KU/L (ref 0–0.34)
SHRIMP: <0.1 KU/L
TIMOTHY GRASS: <0.1 KU/L (ref 0–0.34)

## 2022-07-05 ENCOUNTER — TELEPHONE (OUTPATIENT)
Dept: PULMONOLOGY | Age: 57
End: 2022-07-05

## 2022-07-05 NOTE — TELEPHONE ENCOUNTER
Patient called left  dx with Covid did get Paxlovid started on Garo 7/3/22. Symptoms started 6/30/22  Cough foamy clear  Temp 99.0  O2 sat 95%  Fatigue/HA  Body aches  Toe pain   Right eye blurry  Chills/sweats  Sore throat    3/8/22      Assessment:       · Mild COPD with possible asthma component   · Allergic rhinitis   · Cystic lung disease. DDx [facility] and [facility].  Vascular endothelial growth factor D  · RLL 1.2 cm lung nodule stable since 1/22/2020. Most recent CT 8/15/2021. Mild activity on PET scan 1/31/2020 with SUV of 2.95. Evaluated by CT surgery. DDx granuloma and slow-growing malignancy.    · Seasonal allergy- trees, plants, mold, yeast, dust mites, cat  · 35-pack-year history of smoking- quit Jan 2020       Plan:       · CT chest as planned August 2022  · Continue Symbicort 160/4.5 2 puffs BID   · Continue Singulair 10 mg PO once dose in the evening   · Albuterol 2 puffs Q4-6 hrs PRN  · D/C Flonase  · Trial of Dymista 1 spray/nostril BID   · IgE and immunocap   · Avoid cat exposure  · Patient is up to date with Covid vaccine, Pneumococcal vaccine and Influenza  · Advised to continue with smoking cessation  · Follow up in 6 months or sooner if needed

## 2022-08-20 ENCOUNTER — HOSPITAL ENCOUNTER (OUTPATIENT)
Dept: CT IMAGING | Age: 57
Discharge: HOME | End: 2022-08-20
Payer: COMMERCIAL

## 2022-08-20 DIAGNOSIS — R91.1 PULMONARY NODULE: ICD-10-CM

## 2022-08-20 PROCEDURE — 71250 CT THORAX DX C-: CPT

## 2022-08-23 ENCOUNTER — OFFICE VISIT (OUTPATIENT)
Dept: PULMONOLOGY | Age: 57
End: 2022-08-23
Payer: COMMERCIAL

## 2022-08-23 VITALS
RESPIRATION RATE: 16 BRPM | HEIGHT: 64 IN | WEIGHT: 179 LBS | SYSTOLIC BLOOD PRESSURE: 130 MMHG | BODY MASS INDEX: 30.56 KG/M2 | HEART RATE: 83 BPM | DIASTOLIC BLOOD PRESSURE: 68 MMHG | OXYGEN SATURATION: 98 % | TEMPERATURE: 97.3 F

## 2022-08-23 DIAGNOSIS — Z87.891 PERSONAL HISTORY OF TOBACCO USE: Primary | ICD-10-CM

## 2022-08-23 DIAGNOSIS — R91.1 PULMONARY NODULE: ICD-10-CM

## 2022-08-23 DIAGNOSIS — J44.9 STAGE 1 MILD COPD BY GOLD CLASSIFICATION (HCC): ICD-10-CM

## 2022-08-23 PROCEDURE — 3023F SPIROM DOC REV: CPT | Performed by: INTERNAL MEDICINE

## 2022-08-23 PROCEDURE — G8427 DOCREV CUR MEDS BY ELIG CLIN: HCPCS | Performed by: INTERNAL MEDICINE

## 2022-08-23 PROCEDURE — G8417 CALC BMI ABV UP PARAM F/U: HCPCS | Performed by: INTERNAL MEDICINE

## 2022-08-23 PROCEDURE — 99214 OFFICE O/P EST MOD 30 MIN: CPT | Performed by: INTERNAL MEDICINE

## 2022-08-23 PROCEDURE — 1036F TOBACCO NON-USER: CPT | Performed by: INTERNAL MEDICINE

## 2022-08-23 PROCEDURE — G0296 VISIT TO DETERM LDCT ELIG: HCPCS | Performed by: INTERNAL MEDICINE

## 2022-08-23 PROCEDURE — 3017F COLORECTAL CA SCREEN DOC REV: CPT | Performed by: INTERNAL MEDICINE

## 2022-08-23 NOTE — PROGRESS NOTES
[facility]                                                        CHIEF COMPLAINT: Follow-up CT chest         HPI:   CT chest reviewed by me and noted below. Results were dicussed with patient and multiple good questions were answered. Little cough   Uses Symbicort once a day and forgets to take a second time   Uses Albuterol 1-2 times/day with help       Past Medical History:   Diagnosis Date    Anemia     Asthma     COPD (chronic obstructive pulmonary disease) ([address]     Hiatal hernia     History of blood transfusion     Hyperlipidemia     Migraines     Pulmonary nodule        Past Surgical History:        Procedure Laterality Date    HERNIA REPAIR         Allergies:  is allergic to codeine, percocet [oxycodone-acetaminophen], and vicodin [hydrocodone-acetaminophen]. Social History:    TOBACCO:   reports that she quit smoking about 2 years ago. Her smoking use included cigarettes. She has a 35.00 pack-year smoking history. She has never used smokeless tobacco.  ETOH:   reports current alcohol use.       Family History:   No lung cancer       Current Medications:    Current Outpatient Medications:     azelastine (ASTELIN) 0.1 % nasal spray, 2 sprays by Nasal route 2 times daily as needed for Rhinitis Use in each nostril as directed, Disp: 60 mL, Rfl: 5    fexofenadine (ALLEGRA) 180 MG tablet, , Disp: , Rfl:     albuterol sulfate  (90 Base) MCG/ACT inhaler, INHALE TWO PUFFS BY MOUTH EVERY 6 HOURS AS NEEDED FOR WHEEZING OR FOR SHORTNESS OF BREATH, Disp: 18 g, Rfl: 5    SYMBICORT 160-4.5 MCG/ACT AERO, INHALE TWO PUFFS BY MOUTH TWICE A DAY, Disp: 10.2 g, Rfl: 5    montelukast (SINGULAIR) 10 MG tablet, TAKE ONE TABLET BY MOUTH EVERY EVENING, Disp: 30 tablet, Rfl: 5    aspirin 81 MG chewable tablet, Take 81 mg by mouth daily, Disp: , Rfl:     simvastatin (ZOCOR) 20 MG tablet, , Disp: , Rfl:     traZODone (DESYREL) 50 MG tablet, Take 50 mg by mouth nightly as needed for Sleep, Disp: , Rfl:     Multiple Vitamin (MULTI-VITAMIN DAILY PO), Take by mouth, Disp: , Rfl:     Acetaminophen (TYLENOL EXTRA STRENGTH PO), Take by mouth daily as needed , Disp: , Rfl:           Objective:   PHYSICAL EXAM:    /68   Pulse 83   Temp 97.3 °F (36.3 °C) (Temporal)   Resp 16   Ht 5' 4\" (1.626 m)   Wt 179 lb (81.2 kg)   SpO2 98%   BMI 30.73 kg/m²   Gen: No distress. Eyes: PERRL. No sclera icterus. No conjunctival injection. ENT: No discharge. Pharynx clear. Neck: Trachea midline. No obvious mass. Resp: No accessory muscle use. No crackles. No wheezes. No rhonchi. No dullness on percussion. Good air entry. CV: Regular rate. Regular rhythm. No murmur or rub. No edema. GI: Non-tender. Non-distended. No hernia. Skin: Warm and dry. No nodule on exposed extremities. Lymph: No cervical LAD. No supraclavicular LAD. M/S: No cyanosis. No joint deformity. No clubbing. Neuro: Awake. Alert. Moves all four extremities. Psych: Oriented x 3. No anxiety. DATA reviewed by me:   PFTs 01/27/2020 FVC 3.71(103%) FEV1 2.54(90%) FEV1/FVC 69% TLC 5.72(105%) DLCO 18.26 (74%) 6MW 1140 F LO2 97%     CT chest 1/22/2020  Mediastinum: Limited evaluation without IV contrast.  No mediastinal  adenopathy is appreciated. Central airways appear patent. Lungs/pleura: Mild scattered emphysematous changes are noted. Innumerable  small cysts are seen scattered in the lung parenchyma bilaterally. No  pleural effusion or focal intrapulmonary dense consolidative process. Within  the right lower lobe there is a noncalcified round pulmonary nodule which  measures 1.1 x 1.2 x 1.2 cm, series 3, image 71. Upper Abdomen: Surgical changes noted at the EG junction. No acute finding  appreciated. Soft Tissues/Bones: No axillary adenopathy appreciated. No suspicious bony  lesion appreciated.     PET scan 1/31/2703   Mild metabolic activity in the right lower lobe nodule, SUV 2.95    CT chest 5/15/2020  Stable 1.2 cm right lower lobe nodule    CT chest 8/14/2020   Stable RLL nodule    CT chest 2/15/2021  Stable right lower lobe nodule    CT chest 8/15/2021  Stable right lower lobe nodule    CT chest 8/20/22  images reviewed by me and showed:   Emphysema with stable nodules     Vascular endothelial G factor 49    Labs 4/2/22   IgE 66     Assessment:       RLL 1.2 cm lung nodule stable since 1/22/2020. Mild activity on PET scan 1/31/2020 with SUV of 2.95. Evaluated by CT surgeon. Stable on repeat CT chest, most recent 8/20/22. Favoring granuloma. Cystic lung disease. DDx [facility] and [facility].  Vascular endothelial growth factor D  Mild COPD with possible asthma component   Seasonal allergy- trees, plants, mold, yeast, dust mites, cat  35-pack-year history of smoking- quit Jan 2020       Plan:       CT chest, low dose protocol, screening for lung cancer August 2023. Risks, benefits and alternatives including doing nothing were discussed with patient.   Advised to use Symbicort BID - will add Spiriva if not better in 4-6 weeks   Continue Albuterol 2 puffs Q4-6 hrs PRN  Continue Allegra and Singulair   Patient is up to date with Covid vaccine, Pneumococcal vaccine and Influenza  Advised to continue with smoking cessation  Follow up in 6 months or sooner if needed

## 2022-08-23 NOTE — PROGRESS NOTES
Low Dose CT (LDCT) Lung Screening criteria met:     Age 50-77(Medicare) or 50-80 ([facility])   Pack year smoking >20   Still smoking or less than 15 year since quit   No sign or symptoms of lung cancer   > 11 months since last LDCT     Risks and benefits of lung cancer screening with LDCT scans discussed:    Significance of positive screen - False-positive LDCT results often occur. 95% of all positive results do not lead to a diagnosis of cancer. Usually further imaging can resolve most false-positive results; however, some patients may require invasive procedures. Over diagnosis risk - 10% to 12% of screen-detected lung cancer cases are over diagnosed--that is, the cancer would not have been detected in the patient's lifetime without the screening. Need for follow up screens annually to continue lung cancer screening effectiveness     Risks associated with radiation from annual LDCT- Radiation exposure is about the same as for a mammogram, which is about 1/3 of the annual background radiation exposure from everyday life. Starting screening at age 54 is not likely to increase cancer risk from radiation exposure. Patients with comorbidities resulting in life expectancy of < 10 years, or that would preclude treatment of an abnormality identified on CT, should not be screened due to lack of benefit.     To obtain maximal benefit from this screening, smoking cessation and long-term abstinence from smoking is critical

## 2022-08-24 DIAGNOSIS — J30.2 SEASONAL ALLERGIES: ICD-10-CM

## 2022-08-24 RX ORDER — MONTELUKAST SODIUM 10 MG/1
TABLET ORAL
Qty: 30 TABLET | Refills: 5 | Status: SHIPPED | OUTPATIENT
Start: 2022-08-24

## 2022-09-07 DIAGNOSIS — J44.9 COPD, MILD (HCC): ICD-10-CM

## 2022-09-07 RX ORDER — BUDESONIDE AND FORMOTEROL FUMARATE DIHYDRATE 160; 4.5 UG/1; UG/1
AEROSOL RESPIRATORY (INHALATION)
Qty: 10.2 G | Refills: 5 | Status: SHIPPED | OUTPATIENT
Start: 2022-09-07

## 2023-02-20 DIAGNOSIS — J30.2 SEASONAL ALLERGIES: ICD-10-CM

## 2023-02-20 DIAGNOSIS — J30.9 ALLERGIC RHINITIS, UNSPECIFIED SEASONALITY, UNSPECIFIED TRIGGER: Primary | ICD-10-CM

## 2023-02-21 RX ORDER — AZELASTINE HYDROCHLORIDE 137 UG/1
SPRAY, METERED NASAL
Qty: 60 ML | Refills: 0 | Status: SHIPPED | OUTPATIENT
Start: 2023-02-21

## 2023-02-28 ENCOUNTER — OFFICE VISIT (OUTPATIENT)
Dept: PULMONOLOGY | Age: 58
End: 2023-02-28
Payer: COMMERCIAL

## 2023-02-28 ENCOUNTER — HOSPITAL ENCOUNTER (OUTPATIENT)
Age: 58
Discharge: HOME | End: 2023-02-28
Payer: COMMERCIAL

## 2023-02-28 ENCOUNTER — TELEPHONE (OUTPATIENT)
Dept: PULMONOLOGY | Age: 58
End: 2023-02-28

## 2023-02-28 VITALS
BODY MASS INDEX: 31.41 KG/M2 | OXYGEN SATURATION: 99 % | SYSTOLIC BLOOD PRESSURE: 136 MMHG | WEIGHT: 184 LBS | DIASTOLIC BLOOD PRESSURE: 82 MMHG | HEART RATE: 82 BPM | HEIGHT: 64 IN

## 2023-02-28 DIAGNOSIS — R91.1 PULMONARY NODULE: Primary | ICD-10-CM

## 2023-02-28 DIAGNOSIS — J30.2 SEASONAL ALLERGIES: ICD-10-CM

## 2023-02-28 DIAGNOSIS — J30.9 ALLERGIC RHINITIS, UNSPECIFIED SEASONALITY, UNSPECIFIED TRIGGER: ICD-10-CM

## 2023-02-28 DIAGNOSIS — J45.909 UNCOMPLICATED ASTHMA, UNSPECIFIED ASTHMA SEVERITY, UNSPECIFIED WHETHER PERSISTENT: ICD-10-CM

## 2023-02-28 DIAGNOSIS — J44.9 STAGE 1 MILD COPD BY GOLD CLASSIFICATION (HCC): ICD-10-CM

## 2023-02-28 DIAGNOSIS — J44.9 COPD, MILD (HCC): ICD-10-CM

## 2023-02-28 LAB
BASOPHILS ABSOLUTE: 0 K/UL (ref 0–0.2)
BASOPHILS RELATIVE PERCENT: 0.8 %
EOSINOPHILS ABSOLUTE: 0.2 K/UL (ref 0–0.6)
EOSINOPHILS RELATIVE PERCENT: 2.8 %
HCT VFR BLD CALC: 42.9 % (ref 36–48)
HEMOGLOBIN: 14.2 G/DL (ref 12–16)
LYMPHOCYTES ABSOLUTE: 2.4 K/UL (ref 1–5.1)
LYMPHOCYTES RELATIVE PERCENT: 41.9 %
MCH RBC QN AUTO: 29.2 PG (ref 26–34)
MCHC RBC AUTO-ENTMCNC: 33.1 G/DL (ref 31–36)
MCV RBC AUTO: 88.3 FL (ref 80–100)
MONOCYTES ABSOLUTE: 0.4 K/UL (ref 0–1.3)
MONOCYTES RELATIVE PERCENT: 7.1 %
NEUTROPHILS ABSOLUTE: 2.7 K/UL (ref 1.7–7.7)
NEUTROPHILS RELATIVE PERCENT: 47.4 %
PDW BLD-RTO: 14.1 % (ref 12.4–15.4)
PLATELET # BLD: 272 K/UL (ref 135–450)
PMV BLD AUTO: 9.7 FL (ref 5–10.5)
RBC # BLD: 4.86 M/UL (ref 4–5.2)
WBC # BLD: 5.6 K/UL (ref 4–11)

## 2023-02-28 PROCEDURE — G8417 CALC BMI ABV UP PARAM F/U: HCPCS | Performed by: INTERNAL MEDICINE

## 2023-02-28 PROCEDURE — 3017F COLORECTAL CA SCREEN DOC REV: CPT | Performed by: INTERNAL MEDICINE

## 2023-02-28 PROCEDURE — 85025 COMPLETE CBC W/AUTO DIFF WBC: CPT

## 2023-02-28 PROCEDURE — G8484 FLU IMMUNIZE NO ADMIN: HCPCS | Performed by: INTERNAL MEDICINE

## 2023-02-28 PROCEDURE — 1036F TOBACCO NON-USER: CPT | Performed by: INTERNAL MEDICINE

## 2023-02-28 PROCEDURE — G8427 DOCREV CUR MEDS BY ELIG CLIN: HCPCS | Performed by: INTERNAL MEDICINE

## 2023-02-28 PROCEDURE — 36415 COLL VENOUS BLD VENIPUNCTURE: CPT

## 2023-02-28 PROCEDURE — 3023F SPIROM DOC REV: CPT | Performed by: INTERNAL MEDICINE

## 2023-02-28 PROCEDURE — 99214 OFFICE O/P EST MOD 30 MIN: CPT | Performed by: INTERNAL MEDICINE

## 2023-02-28 RX ORDER — MONTELUKAST SODIUM 10 MG/1
TABLET ORAL
Qty: 30 TABLET | Refills: 5 | Status: SHIPPED | OUTPATIENT
Start: 2023-02-28

## 2023-02-28 RX ORDER — ATORVASTATIN CALCIUM 40 MG/1
40 TABLET, FILM COATED ORAL DAILY
COMMUNITY
Start: 2023-02-20

## 2023-02-28 RX ORDER — CHOLECALCIFEROL (VITAMIN D3) 125 MCG
2000 CAPSULE ORAL DAILY
COMMUNITY
Start: 2023-02-21

## 2023-02-28 RX ORDER — ALBUTEROL SULFATE 90 UG/1
AEROSOL, METERED RESPIRATORY (INHALATION)
Qty: 18 G | Refills: 5 | Status: SHIPPED | OUTPATIENT
Start: 2023-02-28

## 2023-02-28 RX ORDER — BUDESONIDE AND FORMOTEROL FUMARATE DIHYDRATE 160; 4.5 UG/1; UG/1
AEROSOL RESPIRATORY (INHALATION)
Qty: 10.2 G | Refills: 5 | Status: SHIPPED | OUTPATIENT
Start: 2023-02-28

## 2023-02-28 NOTE — PROGRESS NOTES
[facility] Critical [facility] Sleep [facility]                                                        CHIEF COMPLAINT: Follow-up COPD/asthma        HPI:   She has runny nose with congestion   Uses Astelin twice a day   Cough with clear sputum   No hemoptysis   Uses Symbicort BID   Uses Albuterol once a week       Past Medical History:   Diagnosis Date    Anemia     Asthma     COPD (chronic obstructive pulmonary disease) ([facility] [address]     Hiatal hernia     History of blood transfusion     Hyperlipidemia     Migraines     Pulmonary nodule        Past Surgical History:        Procedure Laterality Date    HERNIA REPAIR         Allergies:  is allergic to codeine, percocet [oxycodone-acetaminophen], and vicodin [hydrocodone-acetaminophen]. Social History:    TOBACCO:   reports that she quit smoking about 3 years ago. Her smoking use included cigarettes. She started smoking about 38 years ago. She has a 35.00 pack-year smoking history. She has never used smokeless tobacco.  ETOH:   reports current alcohol use.       Family History:   No lung cancer       Current Medications:    Current Outpatient Medications:     Cholecalciferol (VITAMIN D3) 50 MCG (2000 UT) TABS, Take 2,000 Units by mouth daily, Disp: , Rfl:     atorvastatin (LIPITOR) 40 MG tablet, Take 40 mg by mouth daily, Disp: , Rfl:     Azelastine HCl 137 MCG/SPRAY SOLN, SPRAY TWO SPRAYS IN EACH NOSTRIL TWICE DAILY AS NEEDED FOR RHINITIS, Disp: 60 mL, Rfl: 0    SYMBICORT 160-4.5 MCG/ACT AERO, INHALE TWO PUFFS BY MOUTH TWICE A DAY, Disp: 10.2 g, Rfl: 5    montelukast (SINGULAIR) 10 MG tablet, TAKE ONE TABLET BY MOUTH EVERY EVENING, Disp: 30 tablet, Rfl: 5    fexofenadine (ALLEGRA) 180 MG tablet, , Disp: , Rfl:     albuterol sulfate  (90 Base) MCG/ACT inhaler, INHALE TWO PUFFS BY MOUTH EVERY 6 HOURS AS NEEDED FOR WHEEZING OR FOR SHORTNESS OF BREATH, Disp: 18 g, Rfl: 5    aspirin 81 MG chewable tablet, Take 81 mg by mouth daily, Disp: , Rfl:     traZODone (DESYREL) 50 MG tablet, Take 50 mg by mouth nightly as needed for Sleep, Disp: , Rfl:     Acetaminophen (TYLENOL EXTRA STRENGTH PO), Take by mouth daily as needed , Disp: , Rfl:     Multiple Vitamin (MULTI-VITAMIN DAILY PO), Take by mouth (Patient not taking: Reported on 2/28/2023), Disp: , Rfl:           Objective:   PHYSICAL EXAM:    /82 (Site: Right Upper Arm, Position: Sitting, Cuff Size: Medium Adult)   Pulse 82   Ht 5' 4\" (1.626 m)   Wt 184 lb (83.5 kg)   SpO2 99% Comment: RA  BMI 31.58 kg/m²   Gen: No distress. Eyes: PERRL. No sclera icterus. No conjunctival injection. ENT: No discharge. Pharynx clear. Neck: Trachea midline. No obvious mass. Resp: No accessory muscle use. No crackles. No wheezes. No rhonchi. No dullness on percussion. Good air entry. CV: Regular rate. Regular rhythm. No murmur or rub. No edema. GI: Non-tender. Non-distended. No hernia. Skin: Warm and dry. No nodule on exposed extremities. Lymph: No cervical LAD. No supraclavicular LAD. M/S: No cyanosis. No joint deformity. No clubbing. Neuro: Awake. Alert. Moves all four extremities. Psych: Oriented x 3. No anxiety. DATA reviewed by me:   PFTs 01/27/2020 FVC 3.71(103%) FEV1 2.54(90%) FEV1/FVC 69% TLC 5.72(105%) DLCO 18.26 (74%) 6MW 1140 F LO2 97%     CT chest 1/22/2020  Mediastinum: Limited evaluation without IV contrast.  No mediastinal  adenopathy is appreciated. Central airways appear patent. Lungs/pleura: Mild scattered emphysematous changes are noted. Innumerable  small cysts are seen scattered in the lung parenchyma bilaterally. No  pleural effusion or focal intrapulmonary dense consolidative process. Within  the right lower lobe there is a noncalcified round pulmonary nodule which  measures 1.1 x 1.2 x 1.2 cm, series 3, image 71. Upper Abdomen: Surgical changes noted at the EG junction. No acute finding  appreciated.   Soft Tissues/Bones: No axillary adenopathy appreciated. No suspicious bony  lesion appreciated. PET scan 9/63/2320   Mild metabolic activity in the right lower lobe nodule, SUV 2.95    CT chest 5/15/2020  Stable 1.2 cm right lower lobe nodule    CT chest 8/14/2020   Stable RLL nodule    CT chest 2/15/2021  Stable right lower lobe nodule    CT chest 8/15/2021  Stable right lower lobe nodule    CT chest 8/20/22  images reviewed by me and showed:   Emphysema with stable nodules     Vascular endothelial G factor 49    Labs 4/2/22   IgE 66     Assessment:       RLL 1.2 cm lung nodule stable since 1/22/2020. Mild activity on PET scan 1/31/2020 with SUV of 2.95. Evaluated by CT surgeon. Stable on repeat CT chest, most recent 8/20/22. Favoring granuloma. Cystic lung disease. DDx [facility] and [facility].  Vascular endothelial growth factor D  Mild COPD with asthma component   Allergic rhinitis-not well controlled  Seasonal allergy- trees, plants, mold, yeast, dust mites, cat  35-pack-year history of smoking- quit Jan 2020       Plan:       CT chest, low dose protocol, screening for lung cancer August 2023.    Continue Symbicort BID  Continue albuterol 2 puffs Q4-6 hrs PRN  Continue Allegra and Singulair   Continue Astelin BID PRN  Add Flonase daily PRN  CBC with diff - Assess for possible add-on therapy - anti-IL4 Duplixent - Dupilumab    Patient is up to date with Covid vaccine, Pneumococcal vaccine and Influenza  Advised to continue with smoking cessation  Follow up in 6 months or sooner if needed

## 2023-03-31 NOTE — TELEPHONE ENCOUNTER
PA approved scanned into chart. Will f/u to make sure rx shipped. Uses [facility] pharmacy.

## 2023-04-28 ENCOUNTER — HOSPITAL ENCOUNTER (OUTPATIENT)
Dept: MAMMOGRAPHY | Age: 58
Discharge: HOME | End: 2023-04-28
Payer: COMMERCIAL

## 2023-04-28 DIAGNOSIS — Z12.31 SCREENING MAMMOGRAM, ENCOUNTER FOR: ICD-10-CM

## 2023-04-28 PROCEDURE — 77063 BREAST TOMOSYNTHESIS BI: CPT

## 2023-08-24 ENCOUNTER — TELEPHONE (OUTPATIENT)
Dept: PULMONOLOGY | Age: 58
End: 2023-08-24

## 2023-08-24 DIAGNOSIS — Z87.891 PERSONAL HISTORY OF TOBACCO USE: Primary | ICD-10-CM

## 2023-08-25 ENCOUNTER — HOSPITAL ENCOUNTER (OUTPATIENT)
Dept: CT IMAGING | Age: 58
Discharge: HOME | End: 2023-08-25
Payer: COMMERCIAL

## 2023-08-25 DIAGNOSIS — Z87.891 PERSONAL HISTORY OF TOBACCO USE: ICD-10-CM

## 2023-08-25 PROCEDURE — 71271 CT THORAX LUNG CANCER SCR C-: CPT

## 2023-09-13 ENCOUNTER — OFFICE VISIT (OUTPATIENT)
Dept: PULMONOLOGY | Age: 58
End: 2023-09-13
Payer: COMMERCIAL

## 2023-09-13 VITALS
HEART RATE: 86 BPM | RESPIRATION RATE: 18 BRPM | DIASTOLIC BLOOD PRESSURE: 74 MMHG | WEIGHT: 186.6 LBS | OXYGEN SATURATION: 95 % | HEIGHT: 64 IN | BODY MASS INDEX: 31.86 KG/M2 | SYSTOLIC BLOOD PRESSURE: 116 MMHG

## 2023-09-13 DIAGNOSIS — R91.1 PULMONARY NODULE: Primary | ICD-10-CM

## 2023-09-13 DIAGNOSIS — J44.9 COPD, MILD (HCC): ICD-10-CM

## 2023-09-13 PROCEDURE — 3017F COLORECTAL CA SCREEN DOC REV: CPT | Performed by: INTERNAL MEDICINE

## 2023-09-13 PROCEDURE — G8417 CALC BMI ABV UP PARAM F/U: HCPCS | Performed by: INTERNAL MEDICINE

## 2023-09-13 PROCEDURE — 99214 OFFICE O/P EST MOD 30 MIN: CPT | Performed by: INTERNAL MEDICINE

## 2023-09-13 PROCEDURE — 3023F SPIROM DOC REV: CPT | Performed by: INTERNAL MEDICINE

## 2023-09-13 PROCEDURE — 1036F TOBACCO NON-USER: CPT | Performed by: INTERNAL MEDICINE

## 2023-09-13 PROCEDURE — G8427 DOCREV CUR MEDS BY ELIG CLIN: HCPCS | Performed by: INTERNAL MEDICINE

## 2023-09-13 RX ORDER — ALBUTEROL SULFATE 90 UG/1
AEROSOL, METERED RESPIRATORY (INHALATION)
Qty: 54 G | Refills: 1 | Status: SHIPPED | OUTPATIENT
Start: 2023-09-13

## 2023-09-13 RX ORDER — BUDESONIDE AND FORMOTEROL FUMARATE DIHYDRATE 160; 4.5 UG/1; UG/1
AEROSOL RESPIRATORY (INHALATION)
Qty: 30.6 G | Refills: 1 | Status: SHIPPED | OUTPATIENT
Start: 2023-09-13

## 2023-09-13 RX ORDER — DUPILUMAB 200 MG/1.14ML
INJECTION, SOLUTION SUBCUTANEOUS
COMMUNITY
Start: 2023-08-29

## 2023-09-13 NOTE — PROGRESS NOTES
[facility]                                                        CHIEF COMPLAINT: Follow-up COPD/asthma/CT         HPI:   CT chest reviewed by me and noted below. Results were dicussed with patient and multiple good questions were answered. Started Dupixant, feels much better with it, feels worse when it is time to take it again   Not needing to take her benadryl   Not needing to use the rescue inhaler- Albuterol, much less       Past Medical History:   Diagnosis Date    Anemia     Asthma     COPD (chronic obstructive pulmonary disease) ([address])     Hiatal hernia     History of blood transfusion     Hyperlipidemia     Migraines     Pulmonary nodule        Past Surgical History:        Procedure Laterality Date    HERNIA REPAIR         Allergies:  is allergic to codeine, diphenhydramine, hydrocodone, ibuprofen, oxycodone hcl, percocet [oxycodone-acetaminophen], and vicodin [hydrocodone-acetaminophen]. Social History:    TOBACCO:   reports that she quit smoking about 3 years ago. Her smoking use included cigarettes. She started smoking about 38 years ago. She has a 35.00 pack-year smoking history. She has been exposed to tobacco smoke. She has never used smokeless tobacco.  ETOH:   reports current alcohol use. Family History:   No lung cancer       Current Medications:    Current Outpatient Medications:     DUPIXENT 200 MG/1. 14ML SOPN injection, , Disp: , Rfl:     Azelastine HCl 137 MCG/SPRAY SOLN, SPRAY TWO SPRAYS IN EACH NOSTRIL TWICE DAILY AS NEEDED FOR RHINITIS, Disp: 30 mL, Rfl: 5    Cholecalciferol (VITAMIN D3) 50 MCG (2000 UT) TABS, Take 1 tablet by mouth daily, Disp: , Rfl:     atorvastatin (LIPITOR) 40 MG tablet, Take 1 tablet by mouth daily, Disp: , Rfl:     budesonide-formoterol (SYMBICORT) 160-4.5 MCG/ACT AERO, INHALE TWO PUFFS BY MOUTH TWICE A DAY, Disp: 10.2 g, Rfl: 5    montelukast (SINGULAIR) 10 MG tablet, TAKE ONE TABLET

## 2023-09-24 DIAGNOSIS — J44.9 COPD, MILD (HCC): ICD-10-CM

## 2023-09-25 RX ORDER — BUDESONIDE AND FORMOTEROL FUMARATE DIHYDRATE 160; 4.5 UG/1; UG/1
AEROSOL RESPIRATORY (INHALATION)
Qty: 10.2 G | OUTPATIENT
Start: 2023-09-25

## 2023-10-24 DIAGNOSIS — J30.2 SEASONAL ALLERGIES: ICD-10-CM

## 2023-10-24 RX ORDER — MONTELUKAST SODIUM 10 MG/1
TABLET ORAL
Qty: 30 TABLET | Refills: 5 | Status: SHIPPED | OUTPATIENT
Start: 2023-10-24

## 2024-01-11 ENCOUNTER — TELEMEDICINE (OUTPATIENT)
Dept: PULMONOLOGY | Age: 59
End: 2024-01-11
Payer: COMMERCIAL

## 2024-01-11 DIAGNOSIS — U07.1 COVID-19: ICD-10-CM

## 2024-01-11 DIAGNOSIS — J44.1 COPD EXACERBATION (HCC): Primary | ICD-10-CM

## 2024-01-11 PROCEDURE — G8484 FLU IMMUNIZE NO ADMIN: HCPCS | Performed by: INTERNAL MEDICINE

## 2024-01-11 PROCEDURE — 1036F TOBACCO NON-USER: CPT | Performed by: INTERNAL MEDICINE

## 2024-01-11 PROCEDURE — G8427 DOCREV CUR MEDS BY ELIG CLIN: HCPCS | Performed by: INTERNAL MEDICINE

## 2024-01-11 PROCEDURE — 3017F COLORECTAL CA SCREEN DOC REV: CPT | Performed by: INTERNAL MEDICINE

## 2024-01-11 PROCEDURE — 3023F SPIROM DOC REV: CPT | Performed by: INTERNAL MEDICINE

## 2024-01-11 PROCEDURE — 99214 OFFICE O/P EST MOD 30 MIN: CPT | Performed by: INTERNAL MEDICINE

## 2024-01-11 PROCEDURE — G8417 CALC BMI ABV UP PARAM F/U: HCPCS | Performed by: INTERNAL MEDICINE

## 2024-01-11 RX ORDER — NIRMATRELVIR AND RITONAVIR 300-100 MG
KIT ORAL
COMMUNITY
Start: 2024-01-08

## 2024-01-11 RX ORDER — PREDNISONE 10 MG/1
TABLET ORAL
Qty: 30 TABLET | Refills: 0 | Status: SHIPPED | OUTPATIENT
Start: 2024-01-11 | End: 2024-01-23

## 2024-01-11 NOTE — PROGRESS NOTES
Continue Symbicort BID  Continue Albuterol 2 puffs Q4-6 hrs PRN  Continue Singulair   Continue Astelin BID PRN  Continue anti-IL4 Duplixent - Dupilumab  ok to resume once back to baseline   Patient is up to date with Covid vaccine, Pneumococcal vaccine and Influenza  Advised to continue with smoking cessation  Follow up in 6 months or sooner if needed         Helga Chakraborty is a 58 y.o. female being evaluated by a Virtual Visit (video visit) encounter to address concerns as mentioned above.  A caregiver was present when appropriate. Due to this being a TeleHealth encounter (During COVID-19 public health emergency), evaluation of the following organ systems was limited: Vitals/Constitutional/EENT/Resp/CV/GI//MS/Neuro/Skin/Heme-Lymph-Imm.  Pursuant to the emergency declaration under the Davis Act and the National Emergencies Act, 1135 waiver authority and the Coronavirus Preparedness and Response Supplemental Appropriations Act, this Virtual Visit was conducted with patient's (and/or legal guardian's) consent, to reduce the patient's risk of exposure to COVID-19 and provide necessary medical care.  The patient (and/or legal guardian) has also been advised to contact this office for worsening conditions or problems, and seek emergency medical treatment and/or call 911 if deemed necessary.     Services were provided through a video synchronous discussion virtually to substitute for in-person clinic visit. Patient was located in her home, provider was located in his office.    --Aurelio Sanchez MD on 1/11/2024 at 8:34 AM    An electronic signature was used to authenticate this note.

## 2024-01-18 ENCOUNTER — TELEPHONE (OUTPATIENT)
Dept: PULMONOLOGY | Age: 59
End: 2024-01-18

## 2024-01-18 DIAGNOSIS — J30.9 ALLERGIC RHINITIS, UNSPECIFIED SEASONALITY, UNSPECIFIED TRIGGER: Primary | ICD-10-CM

## 2024-01-18 RX ORDER — DOXYCYCLINE HYCLATE 100 MG
100 TABLET ORAL 2 TIMES DAILY
Qty: 10 TABLET | Refills: 0 | Status: SHIPPED | OUTPATIENT
Start: 2024-01-18 | End: 2024-01-23

## 2024-01-18 RX ORDER — PREDNISONE 10 MG/1
TABLET ORAL
Qty: 30 TABLET | Refills: 0 | Status: SHIPPED | OUTPATIENT
Start: 2024-01-18 | End: 2024-01-30

## 2024-01-18 NOTE — TELEPHONE ENCOUNTER
Pt was dx with covid on 01/08/24 and she is still having some sx and now her mucus is a yellow, clear and white color.  Pt has been taken over the counter medication and nothing is helping Pt had appointment with Dr foster on 01/11/24 for this and she is not better.  Please advise

## 2024-01-21 ENCOUNTER — APPOINTMENT (OUTPATIENT)
Dept: GENERAL RADIOLOGY | Age: 59
End: 2024-01-21
Payer: COMMERCIAL

## 2024-01-21 ENCOUNTER — HOSPITAL ENCOUNTER (EMERGENCY)
Age: 59
Discharge: HOME | End: 2024-01-21
Attending: EMERGENCY MEDICINE
Payer: COMMERCIAL

## 2024-01-21 VITALS
RESPIRATION RATE: 16 BRPM | OXYGEN SATURATION: 100 % | DIASTOLIC BLOOD PRESSURE: 78 MMHG | HEART RATE: 80 BPM | TEMPERATURE: 98.3 F | SYSTOLIC BLOOD PRESSURE: 139 MMHG

## 2024-01-21 DIAGNOSIS — B37.0 ORAL THRUSH: ICD-10-CM

## 2024-01-21 DIAGNOSIS — U07.1 COVID-19: Primary | ICD-10-CM

## 2024-01-21 LAB
ALBUMIN SERPL-MCNC: 4.2 G/DL (ref 3.4–5)
ALBUMIN/GLOB SERPL: 1.4 {RATIO} (ref 1.1–2.2)
ALP SERPL-CCNC: 48 U/L (ref 40–129)
ALT SERPL-CCNC: 52 U/L (ref 10–40)
ANION GAP SERPL CALCULATED.3IONS-SCNC: 12 MMOL/L (ref 3–16)
AST SERPL-CCNC: 24 U/L (ref 15–37)
BASOPHILS # BLD: 0.1 K/UL (ref 0–0.2)
BASOPHILS NFR BLD: 1.1 %
BILIRUB SERPL-MCNC: 0.3 MG/DL (ref 0–1)
BUN SERPL-MCNC: 13 MG/DL (ref 7–20)
CALCIUM SERPL-MCNC: 9.3 MG/DL (ref 8.3–10.6)
CHLORIDE SERPL-SCNC: 99 MMOL/L (ref 99–110)
CO2 SERPL-SCNC: 28 MMOL/L (ref 21–32)
CREAT SERPL-MCNC: 0.8 MG/DL (ref 0.6–1.1)
DEPRECATED RDW RBC AUTO: 13.8 % (ref 12.4–15.4)
EOSINOPHIL # BLD: 0 K/UL (ref 0–0.6)
EOSINOPHIL NFR BLD: 0.5 %
FLUAV RNA RESP QL NAA+PROBE: NOT DETECTED
FLUBV RNA RESP QL NAA+PROBE: NOT DETECTED
GFR SERPLBLD CREATININE-BSD FMLA CKD-EPI: >60 ML/MIN/{1.73_M2}
GLUCOSE SERPL-MCNC: 109 MG/DL (ref 70–99)
HCT VFR BLD AUTO: 41.4 % (ref 36–48)
HGB BLD-MCNC: 13.6 G/DL (ref 12–16)
LYMPHOCYTES # BLD: 2.6 K/UL (ref 1–5.1)
LYMPHOCYTES NFR BLD: 36.9 %
MCH RBC QN AUTO: 29.4 PG (ref 26–34)
MCHC RBC AUTO-ENTMCNC: 32.9 G/DL (ref 31–36)
MCV RBC AUTO: 89.2 FL (ref 80–100)
MONOCYTES # BLD: 0.5 K/UL (ref 0–1.3)
MONOCYTES NFR BLD: 6.8 %
NEUTROPHILS # BLD: 3.8 K/UL (ref 1.7–7.7)
NEUTROPHILS NFR BLD: 54.7 %
PLATELET # BLD AUTO: 317 K/UL (ref 135–450)
PMV BLD AUTO: 7.3 FL (ref 5–10.5)
POTASSIUM SERPL-SCNC: 4.2 MMOL/L (ref 3.5–5.1)
PROT SERPL-MCNC: 7.1 G/DL (ref 6.4–8.2)
RBC # BLD AUTO: 4.65 M/UL (ref 4–5.2)
RSV AG NOSE QL: NEGATIVE
SARS-COV-2 RNA RESP QL NAA+PROBE: DETECTED
SODIUM SERPL-SCNC: 139 MMOL/L (ref 136–145)
WBC # BLD AUTO: 6.9 K/UL (ref 4–11)

## 2024-01-21 PROCEDURE — 99285 EMERGENCY DEPT VISIT HI MDM: CPT

## 2024-01-21 PROCEDURE — 93005 ELECTROCARDIOGRAM TRACING: CPT | Performed by: EMERGENCY MEDICINE

## 2024-01-21 PROCEDURE — 71046 X-RAY EXAM CHEST 2 VIEWS: CPT

## 2024-01-21 PROCEDURE — 80053 COMPREHEN METABOLIC PANEL: CPT

## 2024-01-21 PROCEDURE — 85025 COMPLETE CBC W/AUTO DIFF WBC: CPT

## 2024-01-21 PROCEDURE — 6370000000 HC RX 637 (ALT 250 FOR IP): Performed by: EMERGENCY MEDICINE

## 2024-01-21 PROCEDURE — 87636 SARSCOV2 & INF A&B AMP PRB: CPT

## 2024-01-21 PROCEDURE — 87807 RSV ASSAY W/OPTIC: CPT

## 2024-01-21 RX ORDER — BENZONATATE 100 MG/1
100 CAPSULE ORAL 3 TIMES DAILY PRN
Qty: 21 CAPSULE | Refills: 0 | Status: SHIPPED | OUTPATIENT
Start: 2024-01-21 | End: 2024-01-28

## 2024-01-21 RX ORDER — BENZONATATE 100 MG/1
100 CAPSULE ORAL ONCE
Status: COMPLETED | OUTPATIENT
Start: 2024-01-21 | End: 2024-01-21

## 2024-01-21 RX ORDER — IPRATROPIUM BROMIDE AND ALBUTEROL SULFATE 2.5; .5 MG/3ML; MG/3ML
1 SOLUTION RESPIRATORY (INHALATION) ONCE
Status: DISCONTINUED | OUTPATIENT
Start: 2024-01-21 | End: 2024-01-21

## 2024-01-21 RX ADMIN — BENZONATATE 100 MG: 100 CAPSULE ORAL at 21:32

## 2024-01-22 NOTE — ED PROVIDER NOTES
Date End Date Taking? Authorizing Provider   predniSONE (DELTASONE) 10 MG tablet Take 40 mg by mouth for 3 days 30 mg for 3 days 20 mg for 3 days 10 mg for 3 days. 1/18/24 1/30/24  Aurelio Sanchez MD   PAXLOVID, 300/100, 20 x 150 MG & 10 x 100MG TBPK  1/8/24   Jesus Andrews MD   predniSONE (DELTASONE) 10 MG tablet Take 40 mg by mouth for 3 days 30 mg for 3 days 20 mg for 3 days 10 mg for 3 days. 1/11/24 1/23/24  Aurelio Sanchez MD   montelukast (SINGULAIR) 10 MG tablet TAKE ONE TABLET BY MOUTH EVERY EVENING 10/24/23   Aurelio Sanchez MD   DUPIXENT 200 MG/1.14ML SOPN injection  8/29/23   Jesus Andrews MD   budesonide-formoterol (SYMBICORT) 160-4.5 MCG/ACT AERO INHALE TWO PUFFS BY MOUTH TWICE A DAY 9/13/23   Aurelio Sanchez MD   albuterol sulfate HFA (PROVENTIL;VENTOLIN;PROAIR) 108 (90 Base) MCG/ACT inhaler INHALE TWO PUFFS BY MOUTH EVERY 6 HOURS AS NEEDED FOR WHEEZING OR FOR SHORTNESS OF BREATH 9/13/23   Aurelio Sanchez MD   Azelastine HCl 137 MCG/SPRAY SOLN SPRAY TWO SPRAYS IN EACH NOSTRIL TWICE DAILY AS NEEDED FOR RHINITIS 4/11/23   Aurelio Sanchez MD   EPINEPHrine (EPIPEN 2-MADONNA) 0.3 MG/0.3ML SOAJ injection Inject 0.3 mLs into the skin once for 1 dose Use as directed for allergic reaction 4/6/23 4/6/23  Aurelio Sanchez MD   Cholecalciferol (VITAMIN D3) 50 MCG (2000 UT) TABS Take 1 tablet by mouth daily 2/21/23   Jesus Andrews MD   atorvastatin (LIPITOR) 40 MG tablet Take 1 tablet by mouth daily 2/20/23   Jesus Andrews MD   aspirin 81 MG chewable tablet Take 1 tablet by mouth daily    Jesus Andrews MD   traZODone (DESYREL) 50 MG tablet Take 1 tablet by mouth nightly as needed for Sleep    Jesus Andrews MD   Multiple Vitamin (MULTI-VITAMIN DAILY PO) Take by mouth    Jesus Andrews MD   Acetaminophen (TYLENOL EXTRA STRENGTH PO) Take by mouth daily as needed     Provider, MD Jesus       Social history:  reports that she quit smoking about 4 years ago. Her smoking use

## 2024-01-23 LAB
EKG ATRIAL RATE: 82 BPM
EKG DIAGNOSIS: NORMAL
EKG P AXIS: 52 DEGREES
EKG P-R INTERVAL: 128 MS
EKG Q-T INTERVAL: 378 MS
EKG QRS DURATION: 74 MS
EKG QTC CALCULATION (BAZETT): 441 MS
EKG R AXIS: 21 DEGREES
EKG T AXIS: 38 DEGREES
EKG VENTRICULAR RATE: 82 BPM

## 2024-01-23 PROCEDURE — 93010 ELECTROCARDIOGRAM REPORT: CPT | Performed by: INTERNAL MEDICINE

## 2024-02-12 NOTE — TELEPHONE ENCOUNTER
Pt called would like clarification regarding starting back with her Dupixent as she stopped it while being ill with Covid.. She also had questions regarding \" long - haul\" covid symptoms she has and would like to discuss with provider, appt is scheduled.

## 2024-02-26 RX ORDER — DUPILUMAB 200 MG/1.14ML
200 INJECTION, SOLUTION SUBCUTANEOUS
Qty: 2 EACH | Refills: 5 | Status: SHIPPED | OUTPATIENT
Start: 2024-02-26

## 2024-03-14 DIAGNOSIS — J44.9 COPD, MILD (HCC): ICD-10-CM

## 2024-03-14 RX ORDER — ALBUTEROL SULFATE 90 UG/1
AEROSOL, METERED RESPIRATORY (INHALATION)
Qty: 54 G | Refills: 1 | Status: SHIPPED | OUTPATIENT
Start: 2024-03-14

## 2024-03-27 ENCOUNTER — TELEPHONE (OUTPATIENT)
Dept: PULMONOLOGY | Age: 59
End: 2024-03-27

## 2024-03-27 ENCOUNTER — OFFICE VISIT (OUTPATIENT)
Dept: PULMONOLOGY | Age: 59
End: 2024-03-27
Payer: COMMERCIAL

## 2024-03-27 VITALS
BODY MASS INDEX: 28.52 KG/M2 | WEIGHT: 171.2 LBS | HEART RATE: 104 BPM | OXYGEN SATURATION: 97 % | SYSTOLIC BLOOD PRESSURE: 122 MMHG | HEIGHT: 65 IN | DIASTOLIC BLOOD PRESSURE: 70 MMHG | RESPIRATION RATE: 17 BRPM

## 2024-03-27 DIAGNOSIS — J32.8 OTHER CHRONIC SINUSITIS: ICD-10-CM

## 2024-03-27 DIAGNOSIS — J30.89 ALLERGIC RHINITIS DUE TO OTHER ALLERGIC TRIGGER, UNSPECIFIED SEASONALITY: ICD-10-CM

## 2024-03-27 DIAGNOSIS — Z87.891 PERSONAL HISTORY OF TOBACCO USE: Primary | ICD-10-CM

## 2024-03-27 DIAGNOSIS — J44.9 STAGE 1 MILD COPD BY GOLD CLASSIFICATION (HCC): ICD-10-CM

## 2024-03-27 PROCEDURE — 3023F SPIROM DOC REV: CPT | Performed by: INTERNAL MEDICINE

## 2024-03-27 PROCEDURE — G8484 FLU IMMUNIZE NO ADMIN: HCPCS | Performed by: INTERNAL MEDICINE

## 2024-03-27 PROCEDURE — G0296 VISIT TO DETERM LDCT ELIG: HCPCS | Performed by: INTERNAL MEDICINE

## 2024-03-27 PROCEDURE — 3017F COLORECTAL CA SCREEN DOC REV: CPT | Performed by: INTERNAL MEDICINE

## 2024-03-27 PROCEDURE — G8417 CALC BMI ABV UP PARAM F/U: HCPCS | Performed by: INTERNAL MEDICINE

## 2024-03-27 PROCEDURE — G8427 DOCREV CUR MEDS BY ELIG CLIN: HCPCS | Performed by: INTERNAL MEDICINE

## 2024-03-27 PROCEDURE — 1036F TOBACCO NON-USER: CPT | Performed by: INTERNAL MEDICINE

## 2024-03-27 PROCEDURE — 99214 OFFICE O/P EST MOD 30 MIN: CPT | Performed by: INTERNAL MEDICINE

## 2024-03-27 RX ORDER — AMOXICILLIN AND CLAVULANATE POTASSIUM 875; 125 MG/1; MG/1
TABLET, FILM COATED ORAL
COMMUNITY
Start: 2024-03-15

## 2024-03-27 NOTE — PROGRESS NOTES
18.26 (74%) 6MW 1140 F LO2 97%     CT chest 1/22/2020  Mediastinum: Limited evaluation without IV contrast.  No mediastinal  adenopathy is appreciated.  Central airways appear patent.  Lungs/pleura: Mild scattered emphysematous changes are noted.  Innumerable  small cysts are seen scattered in the lung parenchyma bilaterally.  No  pleural effusion or focal intrapulmonary dense consolidative process.  Within  the right lower lobe there is a noncalcified round pulmonary nodule which  measures 1.1 x 1.2 x 1.2 cm, series 3, image 71.  Upper Abdomen: Surgical changes noted at the EG junction.  No acute finding  appreciated.  Soft Tissues/Bones: No axillary adenopathy appreciated.  No suspicious bony  lesion appreciated.    PET scan 1/31/2020   Mild metabolic activity in the right lower lobe nodule, SUV 2.95    CT chest 5/15/2020  Stable 1.2 cm right lower lobe nodule    CT chest 8/14/2020   Stable RLL nodule    CT chest 2/15/2021  Stable right lower lobe nodule    CT chest 8/15/2021  Stable right lower lobe nodule    CT chest 8/20/22   Emphysema with stable nodules     CT chest 8/25/2023  Unchanged solid bilateral pulmonary nodules    CXR 1/21/24  images reviewed by me and showed:   No acute cardiopulmonary abnormality.     Vascular endothelial G factor 49    Labs 4/2/22   IgE 66     Assessment:       Mild COPD with asthma component   Allergic rhinitis- much better controlled   Chronic sinusitis  with ? Sinus tract  Seasonal allergy- trees, plants, mold, yeast, dust mites, cat  RLL 1.2 cm lung nodule stable since 1/22/2020.  Mild activity on PET scan 1/31/2020 with SUV of 2.95.  Evaluated by CT surgeon.  Stable on repeat CT chest, most recent 8/25/23. Favoring granuloma.  Cystic lung disease. DDx [facility] and [facility].  Vascular endothelial growth factor D  35-pack-year history of smoking- quit Jan 2020       Plan:       Continue Symbicort BID and Albuterol 2 puffs Q4-6 hrs PRN  Continue Singulair   Continue Astelin BID

## 2024-04-19 ENCOUNTER — OFFICE VISIT (OUTPATIENT)
Dept: ENT CLINIC | Age: 59
End: 2024-04-19

## 2024-04-19 VITALS
BODY MASS INDEX: 28.49 KG/M2 | RESPIRATION RATE: 16 BRPM | HEIGHT: 65 IN | DIASTOLIC BLOOD PRESSURE: 90 MMHG | SYSTOLIC BLOOD PRESSURE: 143 MMHG | WEIGHT: 171 LBS | HEART RATE: 88 BPM

## 2024-04-19 DIAGNOSIS — J32.9 CHRONIC SINUSITIS, UNSPECIFIED LOCATION: Primary | ICD-10-CM

## 2024-04-19 DIAGNOSIS — J30.9 ALLERGIC RHINITIS, UNSPECIFIED SEASONALITY, UNSPECIFIED TRIGGER: ICD-10-CM

## 2024-04-19 ASSESSMENT — ENCOUNTER SYMPTOMS
TROUBLE SWALLOWING: 0
VOICE CHANGE: 0
FACIAL SWELLING: 0
EYE ITCHING: 0
SINUS PRESSURE: 1
SHORTNESS OF BREATH: 0
COUGH: 0
APNEA: 0
SORE THROAT: 0

## 2024-04-19 NOTE — PROGRESS NOTES
[facility]  [address], [address]  [place] [address]  P: [phone]       Patient     Helga Chakraborty  1965    ChiefComplaint     Chief Complaint   Patient presents with    New Patient     Patient is being seen for sinus, she had sinus symptoms but ended up having covid, she lost a few teeth during covid, she is still having congestion and has been on multiple rounds of medicine for it.        History of Present Illness     Helga is a 58-year-old female here today for evaluation of persistent nasal congestion, sinus pressure and drainage.  Reports onset of symptoms in January at time of COVID.  Has been treated with 3 courses of antibiotics as well as 2 courses of oral steroids, she is also on Singulair, Astelin, oral antihistamine and Dupixent with persistent symptoms.  States during COVID she had to have 3 teeth pulled 2 of which were upper mandibular molars 1 on the left and 1 on the right.  4.  She had purulent drainage through a communication from where the tooth was extracted on the right.    Past Medical History     Past Medical History:   Diagnosis Date    Anemia     Asthma     COPD (chronic obstructive pulmonary disease) (HCC)     Hiatal hernia     History of blood transfusion     Hyperlipidemia     Migraines     Pulmonary nodule        Past Surgical History     Past Surgical History:   Procedure Laterality Date    HERNIA REPAIR         Family History     Family History   Problem Relation Age of Onset    Breast Cancer Mother 58       Social History     Social History     Tobacco Use    Smoking status: Former     Current packs/day: 0.00     Average packs/day: 1 pack/day for 35.0 years (35.0 ttl pk-yrs)     Types: Cigarettes     Start date:      Quit date: 2020     Years since quittin.3     Passive exposure: Past    Smokeless tobacco: Never    Tobacco comments:     Quit when pregnant.     Vaping Use    Vaping Use: Former    Substances: Unknown   Substance Use Topics

## 2024-04-25 DIAGNOSIS — J30.2 SEASONAL ALLERGIES: ICD-10-CM

## 2024-04-25 RX ORDER — MONTELUKAST SODIUM 10 MG/1
TABLET ORAL
Qty: 30 TABLET | Refills: 5 | Status: SHIPPED | OUTPATIENT
Start: 2024-04-25

## 2024-04-26 ENCOUNTER — HOSPITAL ENCOUNTER (OUTPATIENT)
Dept: CT IMAGING | Age: 59
Discharge: HOME | End: 2024-04-26
Attending: OTOLARYNGOLOGY
Payer: COMMERCIAL

## 2024-04-26 DIAGNOSIS — J32.9 CHRONIC SINUSITIS, UNSPECIFIED LOCATION: ICD-10-CM

## 2024-04-26 PROCEDURE — 70486 CT MAXILLOFACIAL W/O DYE: CPT

## 2024-05-03 ENCOUNTER — PREP FOR PROCEDURE (OUTPATIENT)
Dept: ENT CLINIC | Age: 59
End: 2024-05-03

## 2024-05-03 ENCOUNTER — OFFICE VISIT (OUTPATIENT)
Dept: ENT CLINIC | Age: 59
End: 2024-05-03
Payer: COMMERCIAL

## 2024-05-03 VITALS
WEIGHT: 171 LBS | RESPIRATION RATE: 16 BRPM | HEIGHT: 65 IN | HEART RATE: 73 BPM | BODY MASS INDEX: 28.49 KG/M2 | DIASTOLIC BLOOD PRESSURE: 73 MMHG | SYSTOLIC BLOOD PRESSURE: 146 MMHG

## 2024-05-03 DIAGNOSIS — J34.2 DEVIATED NASAL SEPTUM: ICD-10-CM

## 2024-05-03 DIAGNOSIS — J32.0 CHRONIC MAXILLARY SINUSITIS: Primary | ICD-10-CM

## 2024-05-03 DIAGNOSIS — J32.0 CHRONIC MAXILLARY SINUSITIS: ICD-10-CM

## 2024-05-03 PROCEDURE — 99214 OFFICE O/P EST MOD 30 MIN: CPT | Performed by: OTOLARYNGOLOGY

## 2024-05-03 PROCEDURE — 3017F COLORECTAL CA SCREEN DOC REV: CPT | Performed by: OTOLARYNGOLOGY

## 2024-05-03 PROCEDURE — 1036F TOBACCO NON-USER: CPT | Performed by: OTOLARYNGOLOGY

## 2024-05-03 PROCEDURE — G8427 DOCREV CUR MEDS BY ELIG CLIN: HCPCS | Performed by: OTOLARYNGOLOGY

## 2024-05-03 PROCEDURE — G8417 CALC BMI ABV UP PARAM F/U: HCPCS | Performed by: OTOLARYNGOLOGY

## 2024-05-03 ASSESSMENT — ENCOUNTER SYMPTOMS
SHORTNESS OF BREATH: 0
COUGH: 0
SINUS PRESSURE: 1
APNEA: 0
VOICE CHANGE: 0
EYE ITCHING: 0
SORE THROAT: 0
FACIAL SWELLING: 0
TROUBLE SWALLOWING: 0

## 2024-05-03 NOTE — PROGRESS NOTES
[facility]  [address], [address]  [place] [address]  P: [phone]       Patient     Helga Chakraborty  1965    ChiefComplaint     Chief Complaint   Patient presents with    Follow-up     Patient is being seen for results.       History of Present Illness     Helga is a 58-year-old female here today for evaluation of persistent nasal congestion, sinus pressure and drainage.  Reports onset of symptoms in January at time of COVID.  Has been treated with 3 courses of antibiotics as well as 2 courses of oral steroids, she is also on Singulair, Astelin, oral antihistamine and Dupixent with persistent symptoms.  States during COVID she had to have 3 teeth pulled 2 of which were upper mandibular molars 1 on the left and 1 on the right.  4.  She had purulent drainage through a communication from where the tooth was extracted on the right.    Interval History 5/3/2024: Since last seen ongoing right facial pressure and discomfort as well as posterior nasal drainage.    Past Medical History     Past Medical History:   Diagnosis Date    Anemia     Asthma     COPD (chronic obstructive pulmonary disease) (HCC)     Hiatal hernia     History of blood transfusion     Hyperlipidemia     Migraines     Pulmonary nodule        Past Surgical History     Past Surgical History:   Procedure Laterality Date    HERNIA REPAIR         Family History     Family History   Problem Relation Age of Onset    Breast Cancer Mother 58       Social History     Social History     Tobacco Use    Smoking status: Former     Current packs/day: 0.00     Average packs/day: 1 pack/day for 35.0 years (35.0 ttl pk-yrs)     Types: Cigarettes     Start date:      Quit date: 2020     Years since quittin.3     Passive exposure: Past    Smokeless tobacco: Never    Tobacco comments:     Quit when pregnant.     Vaping Use    Vaping Use: Former    Substances: Unknown   Substance Use Topics    Alcohol use: Yes     Comment: rarely

## 2024-05-06 ENCOUNTER — TELEPHONE (OUTPATIENT)
Dept: ENT CLINIC | Age: 59
End: 2024-05-06

## 2024-05-19 ENCOUNTER — HOSPITAL ENCOUNTER (EMERGENCY)
Age: 59
Discharge: HOME | End: 2024-05-19
Payer: COMMERCIAL

## 2024-05-19 ENCOUNTER — APPOINTMENT (OUTPATIENT)
Dept: GENERAL RADIOLOGY | Age: 59
End: 2024-05-19
Payer: COMMERCIAL

## 2024-05-19 VITALS
WEIGHT: 170 LBS | DIASTOLIC BLOOD PRESSURE: 88 MMHG | OXYGEN SATURATION: 100 % | HEIGHT: 65 IN | SYSTOLIC BLOOD PRESSURE: 160 MMHG | HEART RATE: 94 BPM | RESPIRATION RATE: 19 BRPM | TEMPERATURE: 98.4 F | BODY MASS INDEX: 28.32 KG/M2

## 2024-05-19 DIAGNOSIS — R03.0 ELEVATED BLOOD PRESSURE READING WITHOUT DIAGNOSIS OF HYPERTENSION: ICD-10-CM

## 2024-05-19 DIAGNOSIS — F41.8 SITUATIONAL ANXIETY: Primary | ICD-10-CM

## 2024-05-19 DIAGNOSIS — R91.1 PULMONARY NODULE: ICD-10-CM

## 2024-05-19 DIAGNOSIS — J44.89 ASTHMA WITH COPD (HCC): ICD-10-CM

## 2024-05-19 LAB
ALBUMIN SERPL-MCNC: 4.3 G/DL (ref 3.4–5)
ALBUMIN/GLOB SERPL: 1.4 {RATIO} (ref 1.1–2.2)
ALP SERPL-CCNC: 56 U/L (ref 40–129)
ALT SERPL-CCNC: 26 U/L (ref 10–40)
ANION GAP SERPL CALCULATED.3IONS-SCNC: 13 MMOL/L (ref 3–16)
AST SERPL-CCNC: 24 U/L (ref 15–37)
BASE EXCESS BLDV CALC-SCNC: 0.2 MMOL/L (ref -3–3)
BASOPHILS # BLD: 0 K/UL (ref 0–0.2)
BASOPHILS NFR BLD: 0.7 %
BILIRUB SERPL-MCNC: 0.3 MG/DL (ref 0–1)
BUN SERPL-MCNC: 8 MG/DL (ref 7–20)
CALCIUM SERPL-MCNC: 9.1 MG/DL (ref 8.3–10.6)
CHLORIDE SERPL-SCNC: 102 MMOL/L (ref 99–110)
CO2 BLDV-SCNC: 26 MMOL/L
CO2 SERPL-SCNC: 24 MMOL/L (ref 21–32)
COHGB MFR BLDV: 2.1 % (ref 0–1.5)
CREAT SERPL-MCNC: 0.7 MG/DL (ref 0.6–1.1)
DEPRECATED RDW RBC AUTO: 14.1 % (ref 12.4–15.4)
EOSINOPHIL # BLD: 0.1 K/UL (ref 0–0.6)
EOSINOPHIL NFR BLD: 2.3 %
GFR SERPLBLD CREATININE-BSD FMLA CKD-EPI: >90 ML/MIN/{1.73_M2}
GLUCOSE SERPL-MCNC: 112 MG/DL (ref 70–99)
HCO3 BLDV-SCNC: 25.1 MMOL/L (ref 23–29)
HCT VFR BLD AUTO: 42.5 % (ref 36–48)
HGB BLD-MCNC: 13.9 G/DL (ref 12–16)
LYMPHOCYTES # BLD: 2.5 K/UL (ref 1–5.1)
LYMPHOCYTES NFR BLD: 43.2 %
MCH RBC QN AUTO: 29.3 PG (ref 26–34)
MCHC RBC AUTO-ENTMCNC: 32.7 G/DL (ref 31–36)
MCV RBC AUTO: 89.5 FL (ref 80–100)
METHGB MFR BLDV: 0.1 %
MONOCYTES # BLD: 0.4 K/UL (ref 0–1.3)
MONOCYTES NFR BLD: 6.1 %
NEUTROPHILS # BLD: 2.8 K/UL (ref 1.7–7.7)
NEUTROPHILS NFR BLD: 47.7 %
NT-PROBNP SERPL-MCNC: 83 PG/ML (ref 0–124)
O2 THERAPY: ABNORMAL
PCO2 BLDV: 41.5 MMHG (ref 40–50)
PH BLDV: 7.4 [PH] (ref 7.35–7.45)
PLATELET # BLD AUTO: 274 K/UL (ref 135–450)
PMV BLD AUTO: 7.2 FL (ref 5–10.5)
PO2 BLDV: 37.5 MMHG (ref 25–40)
POTASSIUM SERPL-SCNC: 3.7 MMOL/L (ref 3.5–5.1)
PROT SERPL-MCNC: 7.3 G/DL (ref 6.4–8.2)
RBC # BLD AUTO: 4.74 M/UL (ref 4–5.2)
SAO2 % BLDV: 74 %
SODIUM SERPL-SCNC: 139 MMOL/L (ref 136–145)
TROPONIN, HIGH SENSITIVITY: <6 NG/L (ref 0–14)
TROPONIN, HIGH SENSITIVITY: <6 NG/L (ref 0–14)
WBC # BLD AUTO: 5.8 K/UL (ref 4–11)

## 2024-05-19 PROCEDURE — 36415 COLL VENOUS BLD VENIPUNCTURE: CPT

## 2024-05-19 PROCEDURE — 71046 X-RAY EXAM CHEST 2 VIEWS: CPT

## 2024-05-19 PROCEDURE — 84484 ASSAY OF TROPONIN QUANT: CPT

## 2024-05-19 PROCEDURE — 80053 COMPREHEN METABOLIC PANEL: CPT

## 2024-05-19 PROCEDURE — 85025 COMPLETE CBC W/AUTO DIFF WBC: CPT

## 2024-05-19 PROCEDURE — 93005 ELECTROCARDIOGRAM TRACING: CPT | Performed by: EMERGENCY MEDICINE

## 2024-05-19 PROCEDURE — 82803 BLOOD GASES ANY COMBINATION: CPT

## 2024-05-19 PROCEDURE — 99285 EMERGENCY DEPT VISIT HI MDM: CPT

## 2024-05-19 PROCEDURE — 83880 ASSAY OF NATRIURETIC PEPTIDE: CPT

## 2024-05-19 RX ORDER — HYDROXYZINE PAMOATE 25 MG/1
25 CAPSULE ORAL EVERY 8 HOURS PRN
Qty: 15 CAPSULE | Refills: 0 | Status: SHIPPED | OUTPATIENT
Start: 2024-05-19 | End: 2024-06-02

## 2024-05-19 ASSESSMENT — PAIN - FUNCTIONAL ASSESSMENT
PAIN_FUNCTIONAL_ASSESSMENT: NONE - DENIES PAIN
PAIN_FUNCTIONAL_ASSESSMENT: NONE - DENIES PAIN

## 2024-05-19 ASSESSMENT — LIFESTYLE VARIABLES
HOW OFTEN DO YOU HAVE A DRINK CONTAINING ALCOHOL: NEVER
HOW MANY STANDARD DRINKS CONTAINING ALCOHOL DO YOU HAVE ON A TYPICAL DAY: PATIENT DOES NOT DRINK

## 2024-05-19 NOTE — DISCHARGE INSTRUCTIONS
Pulmonary nodule noted and you are aware.  Follow as indicated by your providers.  I do believe this to be

## 2024-05-19 NOTE — ED PROVIDER NOTES
Patient seen and evaluated by AI:    EKG:  Normal sinus rhythm with a rate of 93.  Normal axis.  Normal intervals and durations.  No significant ST or T wave changes appreciated.  No acute signs of ischemia.  No change when compared to previous EKG on 1/21/2024.     García Yoo II, DO  05/19/24 9450    
    (Please note that portions of this note were completed with a voice recognition program.  Efforts were made to edit the dictations but occasionally words are mis-transcribed.)    Alberto Hayes PA-C (electronically signed)        Alberto Hayes PA-C  05/20/24 1115

## 2024-05-21 LAB
EKG ATRIAL RATE: 93 BPM
EKG DIAGNOSIS: NORMAL
EKG P AXIS: 55 DEGREES
EKG P-R INTERVAL: 128 MS
EKG Q-T INTERVAL: 364 MS
EKG QRS DURATION: 74 MS
EKG QTC CALCULATION (BAZETT): 452 MS
EKG R AXIS: 30 DEGREES
EKG T AXIS: 57 DEGREES
EKG VENTRICULAR RATE: 93 BPM

## 2024-05-21 PROCEDURE — 93010 ELECTROCARDIOGRAM REPORT: CPT | Performed by: INTERNAL MEDICINE

## 2024-06-18 ENCOUNTER — TELEPHONE (OUTPATIENT)
Dept: PULMONOLOGY | Age: 59
End: 2024-06-18

## 2024-06-18 NOTE — TELEPHONE ENCOUNTER
Submitted PA for DUPIXENT  Via [facility] Key: [id number]  STATUS: NOT SENT    While doing the PAf for this medication I could not find a correct dx code for this.      Please send back with a dx code and then we can finish the PA.    If this requires a response please respond to the pool ( P MHCX PSC MEDICATION PRE-AUTH).      Thank you please advise patient.      Follow up done daily; if no decision with in three days we will refax.  If another three days goes by with no decision will call the insurance for status.

## 2024-06-20 NOTE — TELEPHONE ENCOUNTER
Submitted PA for DUPIXENT  [facility]ia [facility] Key: [id number]  STATUS: PENDING.    SENT TO PTS NEW PLAN    Follow up done daily; if no decision with in three days we will refax.  If another three days goes by with no decision will call the insurance for status.

## 2024-06-25 ENCOUNTER — TELEPHONE (OUTPATIENT)
Dept: ENT CLINIC | Age: 59
End: 2024-06-25

## 2024-06-25 ENCOUNTER — TELEPHONE (OUTPATIENT)
Dept: PULMONOLOGY | Age: 59
End: 2024-06-25

## 2024-06-25 NOTE — TELEPHONE ENCOUNTER
Submitted PA for DUPIXENT PENS  Via [facility] [id number] STATUS: PENDING.    Follow up done daily; if no decision with in three days we will refax.  If another three days goes by with no decision will call the insurance for status.

## 2024-06-25 NOTE — TELEPHONE ENCOUNTER
Pt called and stated that insurance is denying an upcoming CT (8/10/24). Pt is requesting a c/b to discuss how to proceed. Pt can be reached at [phone]. Please advise.

## 2024-06-25 NOTE — TELEPHONE ENCOUNTER
Pt stated this was supposed to be approved for the dupixent Pen. It looks like PA was ran under syringe Pt has a fear of needles and is wondering if this can be changed to Pens and resubmitted please advise

## 2024-06-25 NOTE — TELEPHONE ENCOUNTER
S/W patient and updated Insurance. Also confirmed ENT surgery on 7/8/24 and advised to arrive at 9 AM

## 2024-06-26 NOTE — TELEPHONE ENCOUNTER
Pt stated that insurance is requiring a peer to peer at number [phone] opt 2 tracking number [id number] ref number [id number]

## 2024-06-28 ENCOUNTER — TELEPHONE (OUTPATIENT)
Dept: ENT CLINIC | Age: 59
End: 2024-06-28

## 2024-06-28 RX ORDER — FEXOFENADINE HCL 180 MG/1
180 TABLET ORAL DAILY
COMMUNITY

## 2024-06-28 RX ORDER — EPINEPHRINE 0.3 MG/.3ML
0.3 INJECTION SUBCUTANEOUS PRN
COMMUNITY

## 2024-06-28 NOTE — TELEPHONE ENCOUNTER
Patient called billing and states she is supposed to call them back on 7/26 due to billing info not being available until 2 weeks before the CT.

## 2024-06-28 NOTE — TELEPHONE ENCOUNTER
Pt called and stated dupixent copay will be $3000. I informed pt to call dupixent lucius and state she cannot afford copay and see if she qualifies for assistance. Pt took number and stated she would call.     I also informed pt since her pcp is a [facility] provider she should switch all medications to [facility] or [facility]. Pt's can get discounts on medications. I also advised her to inform them about dupixent- I think they will help with that.

## 2024-06-28 NOTE — TELEPHONE ENCOUNTER
Pt called office and stated her insurance will only cover 50% of CT. I informed pt to call billing and inform them you need pt assistance and to help with qualification process. Pt voiced understanding. Pt is to call back and let us know. (Also see prior auth encounter.)

## 2024-06-28 NOTE — TELEPHONE ENCOUNTER
Patient states dupixent is emailing her dupixent forms, as well as emailing the office forms as well. All she could tell me was that they are sending the forms \"the same place they were sent last year\"

## 2024-07-08 ENCOUNTER — HOSPITAL ENCOUNTER (OUTPATIENT)
Age: 59
Setting detail: OUTPATIENT SURGERY
Discharge: HOME | End: 2024-07-08
Attending: OTOLARYNGOLOGY | Admitting: OTOLARYNGOLOGY
Payer: COMMERCIAL

## 2024-07-08 ENCOUNTER — ANESTHESIA (OUTPATIENT)
Dept: OPERATING ROOM | Age: 59
End: 2024-07-08
Payer: COMMERCIAL

## 2024-07-08 ENCOUNTER — ANESTHESIA EVENT (OUTPATIENT)
Dept: OPERATING ROOM | Age: 59
End: 2024-07-08
Payer: COMMERCIAL

## 2024-07-08 VITALS
HEART RATE: 87 BPM | TEMPERATURE: 98.4 F | WEIGHT: 170 LBS | BODY MASS INDEX: 28.32 KG/M2 | OXYGEN SATURATION: 97 % | SYSTOLIC BLOOD PRESSURE: 174 MMHG | DIASTOLIC BLOOD PRESSURE: 80 MMHG | HEIGHT: 65 IN | RESPIRATION RATE: 16 BRPM

## 2024-07-08 DIAGNOSIS — J34.2 DEVIATED NASAL SEPTUM: ICD-10-CM

## 2024-07-08 DIAGNOSIS — G89.18 POST-OP PAIN: Primary | ICD-10-CM

## 2024-07-08 DIAGNOSIS — J32.0 CHRONIC MAXILLARY SINUSITIS: ICD-10-CM

## 2024-07-08 PROCEDURE — A4217 STERILE WATER/SALINE, 500 ML: HCPCS | Performed by: OTOLARYNGOLOGY

## 2024-07-08 PROCEDURE — 3700000000 HC ANESTHESIA ATTENDED CARE: Performed by: OTOLARYNGOLOGY

## 2024-07-08 PROCEDURE — 2580000003 HC RX 258: Performed by: ANESTHESIOLOGY

## 2024-07-08 PROCEDURE — 3600000004 HC SURGERY LEVEL 4 BASE: Performed by: OTOLARYNGOLOGY

## 2024-07-08 PROCEDURE — 3600000014 HC SURGERY LEVEL 4 ADDTL 15MIN: Performed by: OTOLARYNGOLOGY

## 2024-07-08 PROCEDURE — 30520 REPAIR OF NASAL SEPTUM: CPT | Performed by: OTOLARYNGOLOGY

## 2024-07-08 PROCEDURE — 2709999900 HC NON-CHARGEABLE SUPPLY: Performed by: OTOLARYNGOLOGY

## 2024-07-08 PROCEDURE — 7100000010 HC PHASE II RECOVERY - FIRST 15 MIN: Performed by: OTOLARYNGOLOGY

## 2024-07-08 PROCEDURE — 6370000000 HC RX 637 (ALT 250 FOR IP): Performed by: OTOLARYNGOLOGY

## 2024-07-08 PROCEDURE — 2720000010 HC SURG SUPPLY STERILE: Performed by: OTOLARYNGOLOGY

## 2024-07-08 PROCEDURE — 2580000003 HC RX 258: Performed by: OTOLARYNGOLOGY

## 2024-07-08 PROCEDURE — 2500000003 HC RX 250 WO HCPCS: Performed by: NURSE ANESTHETIST, CERTIFIED REGISTERED

## 2024-07-08 PROCEDURE — 2500000003 HC RX 250 WO HCPCS: Performed by: OTOLARYNGOLOGY

## 2024-07-08 PROCEDURE — 6360000002 HC RX W HCPCS: Performed by: NURSE ANESTHETIST, CERTIFIED REGISTERED

## 2024-07-08 PROCEDURE — 3700000001 HC ADD 15 MINUTES (ANESTHESIA): Performed by: OTOLARYNGOLOGY

## 2024-07-08 PROCEDURE — 6370000000 HC RX 637 (ALT 250 FOR IP): Performed by: ANESTHESIOLOGY

## 2024-07-08 PROCEDURE — 7100000000 HC PACU RECOVERY - FIRST 15 MIN: Performed by: OTOLARYNGOLOGY

## 2024-07-08 PROCEDURE — 31267 ENDOSCOPY MAXILLARY SINUS: CPT | Performed by: OTOLARYNGOLOGY

## 2024-07-08 PROCEDURE — 88305 TISSUE EXAM BY PATHOLOGIST: CPT

## 2024-07-08 PROCEDURE — 7100000001 HC PACU RECOVERY - ADDTL 15 MIN: Performed by: OTOLARYNGOLOGY

## 2024-07-08 PROCEDURE — 7100000011 HC PHASE II RECOVERY - ADDTL 15 MIN: Performed by: OTOLARYNGOLOGY

## 2024-07-08 RX ORDER — LIDOCAINE HYDROCHLORIDE 20 MG/ML
INJECTION, SOLUTION INFILTRATION; PERINEURAL PRN
Status: DISCONTINUED | OUTPATIENT
Start: 2024-07-08 | End: 2024-07-08 | Stop reason: SDUPTHER

## 2024-07-08 RX ORDER — SODIUM CHLORIDE 0.9 % (FLUSH) 0.9 %
5-40 SYRINGE (ML) INJECTION PRN
Status: DISCONTINUED | OUTPATIENT
Start: 2024-07-08 | End: 2024-07-08 | Stop reason: HOSPADM

## 2024-07-08 RX ORDER — SODIUM CHLORIDE 0.9 % (FLUSH) 0.9 %
5-40 SYRINGE (ML) INJECTION EVERY 12 HOURS SCHEDULED
Status: DISCONTINUED | OUTPATIENT
Start: 2024-07-08 | End: 2024-07-08 | Stop reason: HOSPADM

## 2024-07-08 RX ORDER — IPRATROPIUM BROMIDE AND ALBUTEROL SULFATE 2.5; .5 MG/3ML; MG/3ML
1 SOLUTION RESPIRATORY (INHALATION)
Status: DISCONTINUED | OUTPATIENT
Start: 2024-07-08 | End: 2024-07-08 | Stop reason: HOSPADM

## 2024-07-08 RX ORDER — LIDOCAINE HYDROCHLORIDE 10 MG/ML
1 INJECTION, SOLUTION EPIDURAL; INFILTRATION; INTRACAUDAL; PERINEURAL
Status: DISCONTINUED | OUTPATIENT
Start: 2024-07-08 | End: 2024-07-08 | Stop reason: HOSPADM

## 2024-07-08 RX ORDER — ROCURONIUM BROMIDE 10 MG/ML
INJECTION, SOLUTION INTRAVENOUS PRN
Status: DISCONTINUED | OUTPATIENT
Start: 2024-07-08 | End: 2024-07-08 | Stop reason: SDUPTHER

## 2024-07-08 RX ORDER — HYDROMORPHONE HYDROCHLORIDE 2 MG/ML
INJECTION, SOLUTION INTRAMUSCULAR; INTRAVENOUS; SUBCUTANEOUS PRN
Status: DISCONTINUED | OUTPATIENT
Start: 2024-07-08 | End: 2024-07-08 | Stop reason: SDUPTHER

## 2024-07-08 RX ORDER — TRAMADOL HYDROCHLORIDE 50 MG/1
100 TABLET ORAL
Status: DISCONTINUED | OUTPATIENT
Start: 2024-07-08 | End: 2024-07-08 | Stop reason: HOSPADM

## 2024-07-08 RX ORDER — ACETAMINOPHEN 500 MG
1000 TABLET ORAL ONCE
Status: COMPLETED | OUTPATIENT
Start: 2024-07-08 | End: 2024-07-08

## 2024-07-08 RX ORDER — ONDANSETRON 2 MG/ML
INJECTION INTRAMUSCULAR; INTRAVENOUS PRN
Status: DISCONTINUED | OUTPATIENT
Start: 2024-07-08 | End: 2024-07-08 | Stop reason: SDUPTHER

## 2024-07-08 RX ORDER — NALOXONE HYDROCHLORIDE 0.4 MG/ML
INJECTION, SOLUTION INTRAMUSCULAR; INTRAVENOUS; SUBCUTANEOUS PRN
Status: DISCONTINUED | OUTPATIENT
Start: 2024-07-08 | End: 2024-07-08 | Stop reason: HOSPADM

## 2024-07-08 RX ORDER — ONDANSETRON 2 MG/ML
4 INJECTION INTRAMUSCULAR; INTRAVENOUS
Status: DISCONTINUED | OUTPATIENT
Start: 2024-07-08 | End: 2024-07-08 | Stop reason: HOSPADM

## 2024-07-08 RX ORDER — PROCHLORPERAZINE EDISYLATE 5 MG/ML
5 INJECTION INTRAMUSCULAR; INTRAVENOUS
Status: DISCONTINUED | OUTPATIENT
Start: 2024-07-08 | End: 2024-07-08 | Stop reason: HOSPADM

## 2024-07-08 RX ORDER — OXYMETAZOLINE HYDROCHLORIDE 0.05 G/100ML
SPRAY NASAL PRN
Status: DISCONTINUED | OUTPATIENT
Start: 2024-07-08 | End: 2024-07-08 | Stop reason: HOSPADM

## 2024-07-08 RX ORDER — MAGNESIUM HYDROXIDE 1200 MG/15ML
LIQUID ORAL CONTINUOUS PRN
Status: DISCONTINUED | OUTPATIENT
Start: 2024-07-08 | End: 2024-07-08 | Stop reason: HOSPADM

## 2024-07-08 RX ORDER — PROPOFOL 10 MG/ML
INJECTION, EMULSION INTRAVENOUS PRN
Status: DISCONTINUED | OUTPATIENT
Start: 2024-07-08 | End: 2024-07-08 | Stop reason: SDUPTHER

## 2024-07-08 RX ORDER — LIDOCAINE HYDROCHLORIDE AND EPINEPHRINE 10; 10 MG/ML; UG/ML
INJECTION, SOLUTION INFILTRATION; PERINEURAL PRN
Status: DISCONTINUED | OUTPATIENT
Start: 2024-07-08 | End: 2024-07-08 | Stop reason: HOSPADM

## 2024-07-08 RX ORDER — FENTANYL CITRATE 50 UG/ML
INJECTION, SOLUTION INTRAMUSCULAR; INTRAVENOUS PRN
Status: DISCONTINUED | OUTPATIENT
Start: 2024-07-08 | End: 2024-07-08 | Stop reason: SDUPTHER

## 2024-07-08 RX ORDER — MEPERIDINE HYDROCHLORIDE 50 MG/ML
12.5 INJECTION INTRAMUSCULAR; INTRAVENOUS; SUBCUTANEOUS EVERY 5 MIN PRN
Status: DISCONTINUED | OUTPATIENT
Start: 2024-07-08 | End: 2024-07-08 | Stop reason: HOSPADM

## 2024-07-08 RX ORDER — TRAMADOL HYDROCHLORIDE 50 MG/1
50 TABLET ORAL EVERY 4 HOURS PRN
Qty: 18 TABLET | Refills: 0 | Status: SHIPPED | OUTPATIENT
Start: 2024-07-08 | End: 2024-07-11

## 2024-07-08 RX ORDER — DEXAMETHASONE SODIUM PHOSPHATE 10 MG/ML
INJECTION INTRAMUSCULAR; INTRAVENOUS PRN
Status: DISCONTINUED | OUTPATIENT
Start: 2024-07-08 | End: 2024-07-08 | Stop reason: SDUPTHER

## 2024-07-08 RX ORDER — SODIUM CHLORIDE 9 MG/ML
INJECTION, SOLUTION INTRAVENOUS PRN
Status: DISCONTINUED | OUTPATIENT
Start: 2024-07-08 | End: 2024-07-08 | Stop reason: HOSPADM

## 2024-07-08 RX ORDER — SODIUM CHLORIDE, SODIUM LACTATE, POTASSIUM CHLORIDE, CALCIUM CHLORIDE 600; 310; 30; 20 MG/100ML; MG/100ML; MG/100ML; MG/100ML
INJECTION, SOLUTION INTRAVENOUS CONTINUOUS
Status: DISCONTINUED | OUTPATIENT
Start: 2024-07-08 | End: 2024-07-08 | Stop reason: HOSPADM

## 2024-07-08 RX ORDER — MIDAZOLAM HYDROCHLORIDE 1 MG/ML
INJECTION INTRAMUSCULAR; INTRAVENOUS PRN
Status: DISCONTINUED | OUTPATIENT
Start: 2024-07-08 | End: 2024-07-08 | Stop reason: SDUPTHER

## 2024-07-08 RX ADMIN — SUGAMMADEX 150 MG: 100 INJECTION, SOLUTION INTRAVENOUS at 10:58

## 2024-07-08 RX ADMIN — DEXAMETHASONE SODIUM PHOSPHATE 10 MG: 10 INJECTION INTRAMUSCULAR; INTRAVENOUS at 10:28

## 2024-07-08 RX ADMIN — ACETAMINOPHEN 1000 MG: 500 TABLET ORAL at 09:37

## 2024-07-08 RX ADMIN — FENTANYL CITRATE 50 MCG: 50 INJECTION, SOLUTION INTRAMUSCULAR; INTRAVENOUS at 10:15

## 2024-07-08 RX ADMIN — LIDOCAINE HYDROCHLORIDE 50 MG: 20 INJECTION, SOLUTION INFILTRATION; PERINEURAL at 10:15

## 2024-07-08 RX ADMIN — SODIUM CHLORIDE, POTASSIUM CHLORIDE, SODIUM LACTATE AND CALCIUM CHLORIDE: 600; 310; 30; 20 INJECTION, SOLUTION INTRAVENOUS at 09:34

## 2024-07-08 RX ADMIN — ONDANSETRON 4 MG: 2 INJECTION INTRAMUSCULAR; INTRAVENOUS at 10:28

## 2024-07-08 RX ADMIN — HYDROMORPHONE HYDROCHLORIDE 0.4 MG: 2 INJECTION, SOLUTION INTRAMUSCULAR; INTRAVENOUS; SUBCUTANEOUS at 10:45

## 2024-07-08 RX ADMIN — ROCURONIUM BROMIDE 30 MG: 50 INJECTION, SOLUTION INTRAVENOUS at 10:15

## 2024-07-08 RX ADMIN — PROPOFOL 200 MG: 10 INJECTION, EMULSION INTRAVENOUS at 10:15

## 2024-07-08 RX ADMIN — FENTANYL CITRATE 50 MCG: 50 INJECTION, SOLUTION INTRAMUSCULAR; INTRAVENOUS at 10:26

## 2024-07-08 RX ADMIN — MIDAZOLAM 1 MG: 1 INJECTION INTRAMUSCULAR; INTRAVENOUS at 10:12

## 2024-07-08 ASSESSMENT — PAIN - FUNCTIONAL ASSESSMENT
PAIN_FUNCTIONAL_ASSESSMENT: ACTIVITIES ARE NOT PREVENTED
PAIN_FUNCTIONAL_ASSESSMENT: NONE - DENIES PAIN
PAIN_FUNCTIONAL_ASSESSMENT: 0-10

## 2024-07-08 ASSESSMENT — PAIN SCALES - GENERAL
PAINLEVEL_OUTOF10: 4
PAINLEVEL_OUTOF10: 0
PAINLEVEL_OUTOF10: 4
PAINLEVEL_OUTOF10: 4

## 2024-07-08 ASSESSMENT — PAIN DESCRIPTION - LOCATION: LOCATION: NOSE

## 2024-07-08 ASSESSMENT — PAIN DESCRIPTION - DESCRIPTORS: DESCRIPTORS: ACHING

## 2024-07-08 NOTE — DISCHARGE INSTRUCTIONS
Sinus Surgery Discharge Instructions   General Anesthesia or Sedation   · Do not drive or operate heavy machinery for 24 hours.   · Do not consume alcohol, tranquilizers, sleeping medications, or any non-prescribed medications for 24 hours.   · Do not make important decisions or sign any important papers in the next 24 hours.   · You should have someone with you tonight at home.   · You may appear flushed for several hours after surgery   Activity   You are advised to go directly home from the hospital. Restrict your activities and rest for a day. Resume light to normal activity tomorrow.   Specific activity instructions:   · NO BENDING   · NO LIFTING   · NO STRAINING   · NO NOSE BLOWING   · COUGH AND SNEEZE WITH YOUR MOUTH OPEN   · Sleep with your head elevated with a couple pillows.   Fluids and Diet   · Begin with clear liquids, bouillon, dry toast, soda crackers. If not nauseated, you may go to a regular diet when you desire. Greasy and spicy foods are not advised after anesthesia   Medications   · Prescription sent with you. Use as directed.   · When taking pain medications, you may experience dizziness or drowsiness.   · Do not drink alcohol or drive when you are taking these medications.   · You may take a non-prescription \"headache remedy\" type medications that you normally use, HOWEVER, NO MOTRIN, ASPIRIN, IBUPROFEN, ADVIL. TYLENOL CONTAINING PRODUCTS ONLY.   · You may resume your daily prescription medication schedule EXCLUDING BLOOD THINNERS.   Operative Site   · Headache, plugged nose and facial pain is normal after surgery   · Oozing is normal, you may change mustache dressings (gauze pad) under your nose as need for oozing, call if heavy bleeding that persists   · POST OP DAY 1-2: USE OVER THE COUNTER SALINE NASAL SPRAY (MIST) 4-6 TIMES A DAY, YOU CANNOT OVERUSE IT.   · POST OP DAY 3-ONWARD: RINSE YOUR NOSE 2-4 TIMES A DAY USING EITHER A BULB SYRINGE OR THE NETI-POT STYLE OF RINSING. IF YOU HAVE

## 2024-07-08 NOTE — H&P
[facility]  [address], [address]  [place] [address]  P: [phone]        Patient      Helga Chakraborty  1965     ChiefComplaint           Chief Complaint   Patient presents with    Follow-up       Patient is being seen for results.         History of Present Illness      Helga is a 58-year-old female here today for evaluation of persistent nasal congestion, sinus pressure and drainage.  Reports onset of symptoms in January at time of COVID.  Has been treated with 3 courses of antibiotics as well as 2 courses of oral steroids, she is also on Singulair, Astelin, oral antihistamine and Dupixent with persistent symptoms.  States during COVID she had to have 3 teeth pulled 2 of which were upper mandibular molars 1 on the left and 1 on the right.  4.  She had purulent drainage through a communication from where the tooth was extracted on the right.     Interval History 5/3/2024: Since last seen ongoing right facial pressure and discomfort as well as posterior nasal drainage.     Past Medical History      Past Medical History        Past Medical History:   Diagnosis Date    Anemia      Asthma      COPD (chronic obstructive pulmonary disease) (HCC)      Hiatal hernia      History of blood transfusion      Hyperlipidemia      Migraines      Pulmonary nodule              Past Surgical History      Past Surgical History         Past Surgical History:   Procedure Laterality Date    HERNIA REPAIR                Family History      Family History         Family History   Problem Relation Age of Onset    Breast Cancer Mother 58            Social History      Social History            Tobacco Use    Smoking status: Former       Current packs/day: 0.00       Average packs/day: 1 pack/day for 35.0 years (35.0 ttl pk-yrs)       Types: Cigarettes       Start date:        Quit date: 2020       Years since quittin.3       Passive exposure: Past    Smokeless tobacco: Never    Tobacco comments:

## 2024-07-08 NOTE — ANESTHESIA POSTPROCEDURE EVALUATION
Department of Anesthesiology  Postprocedure Note    Patient: Helga Chakraborty  MRN: [id number]  YOB: 1965  Date of evaluation: 7/8/2024    Procedure Summary       Date: 07/08/24 Room / Location: [address]    Anesthesia Start: 1012 Anesthesia Stop: 1116    Procedure: RIGHT MAXILLARY ANTROSTOMY WITH TISSUE REMOVAL, SEPTOPLASTY, IMAGE NAVIGATION (Nose) Diagnosis:       Chronic maxillary sinusitis      Deviated nasal septum      (Chronic maxillary sinusitis [J32.0])      (Deviated nasal septum [J34.2])    Surgeons: Malissa Jason DO Responsible Provider: Gopi West MD    Anesthesia Type: General ASA Status: 2            Anesthesia Type: General    Subhash Phase I: Subhash Score: 10    Subhash Phase II: Subhash Score: 10    Anesthesia Post Evaluation    Patient location during evaluation: PACU  Patient participation: complete - patient participated  Level of consciousness: awake and alert  Airway patency: patent  Nausea & Vomiting: no nausea and no vomiting  Cardiovascular status: hemodynamically stable  Respiratory status: acceptable  Hydration status: euvolemic  Pain management: adequate    No notable events documented.

## 2024-07-08 NOTE — OP NOTE
[facility]  DIVISION OF OTOLARYNGOLOGY- HEAD & NECK SURGERY  OPERATIVE REPORT    Patient Name: Helga Chakraborty  YOB: 1965  Medical Record Number:  [id number]  Billing Number:  [id number]  Date of Procedure: 07/08/24  Time: 1100    Pre Operative Diagnoses:   Problem List Items Addressed This Visit          Respiratory    Chronic maxillary sinusitis    Relevant Medications    Pseudoephedrine HCl (SUDAFED PO)    fexofenadine (ALLEGRA) 180 MG tablet    oxymetazoline (AFRIN) 0.05 % nasal spray    Other Relevant Orders    Surgical Pathology    Deviated nasal septum    Relevant Orders    Surgical Pathology     Other Visit Diagnoses       Post-op pain    -  Primary    Relevant Medications    traMADol (ULTRAM) 50 MG tablet          Post Operative Diagnoses:    Problem List Items Addressed This Visit          Respiratory    Chronic maxillary sinusitis    Relevant Medications    Pseudoephedrine HCl (SUDAFED PO)    fexofenadine (ALLEGRA) 180 MG tablet    oxymetazoline (AFRIN) 0.05 % nasal spray    Other Relevant Orders    Surgical Pathology    Deviated nasal septum    Relevant Orders    Surgical Pathology     Other Visit Diagnoses       Post-op pain    -  Primary    Relevant Medications    traMADol (ULTRAM) 50 MG tablet                   Procedure:  right maxillary antrostomy with tissue removal with image navigation, septoplasty       Surgeon: Malissa Jason DO    OR Staff/ Assistant:  Circulator: Steffen Howard RN  Scrub Person First: Nguyen Daly  Circulator Assist: Laura Evangelitsa RN    Anesthesia:  General anesthesia.    Estimated Blood Loss: 10mL     Findings:  1) deviated nasal septum 2) edematous right maxillary sinus mucosa    DETAILS OF PROCEDURE(S):   Patient was brought to the operating room and placed supine on the operating room table.  After general anesthesia was obtained, Afrin soaked pledgets were placed into both nasal cavities.  The Fusion headset was placed and registered for use.  Prior to

## 2024-07-08 NOTE — PROGRESS NOTES
Date and time of surgery : 7/8/24 at 1100             Arrival Time:  0900     Bring Picture ID and insurance card.  Please wear simple, loose fitting clothing to the hospital.   Do not bring valuables (money, credit cards, checkbooks, etc.)   Do not wear any makeup (including  eye makeup) and no nail polish or artificial nails on your fingers or toes.  DO NOT wear any jewelry or piercings on day of surgery.  All body piercing jewelry must be removed.  If you have dentures, they will be removed before going to the OR; we will provide you a container.  If you wear contact lenses or glasses, they will be removed; please bring a case for them.  Shower the evening before or morning of surgery with antibacterial soap.  Nothing to eat or drink after midnight the day before surgery.   You may brush your teeth and gargle the morning of surgery.  DO NOT SWALLOW WATER.   Do not take any morning meds the day of your surgery except use your Symbicort inhaler prior to coming  Aspirin, Ibuprofen, Advil, Naproxen, Vitamin E and other Anti-inflammatory products and supplements should be stopped for 5 -7days before surgery or as directed by your physician.  Do not smoke or drink any alcoholic beverages 24 hours prior to surgery.  This includes NA Beer. Refrain from the usage of any recreational drugs, including non-prescribed prescription drugs.   You MUST plan for a responsible adult to stay on site while you are here and take you home after your surgery. You will not be allowed to leave alone or drive yourself home. It is strongly suggested someone stay with you the first 24 hrs. Your surgery will be cancelled if you do not have a ride home.  To help prevent infection, change your sheets the night before surgery.   If you  have a Living Will and Durable Power of  for Healthcare, please bring in a copy.  Notify your Surgeon if you develop any illness between now and time of surgery. Cough, cold, fever, sore throat, nausea, 
Discharge instructions reviewed with patient/responsible adult and understanding verbalized. Discharge instructions signed and copies given. Patient discharged home with belongings. Discharge criteria have been met per policy.   
Helga Chakraborty    Age 59 y.o.    female    1965    MRN [id number]    7/8/2024  Arrival Time_____________  OR Time____________100 Min     Procedure(s):  RIGHT MAXILLARY ANTROSTOMY WITH TISSUE REMOVAL, SEPTOPLASTY, IMAGE NAVIGATION                      General    Surgeon(s):  Malissa Jason, DO       Phone [phone] (home)     [facility]  Date  Info Source  Home  Cell         Work  _____________________________________________________________________  _____________________________________________________________________  _____________________________________________________________________  _____________________________________________________________________  _____________________________________________________________________    PCP _____________________________ Phone_________________     H&P  ________________  Bringing      Chart              Epic      DOS      Called________  EKG ________________   Bringing      Chart              Epic      DOS      Called________  LABS________________   Bringing     Chart              Epic      DOS      Called________  Cardiac Clearance ______ Bringing      Chart              Epic      DOS      Called________  Pulmonary Clearance____ Bringing      Chart              Epic      DOS      Called________    Cardiologist________________________ Phone___________________________  Pulmonologist_______________________Phone___________________________    ? Advance Directives   ? Latter day concerns / Waiver on Chart            PAT Communications________________  ? Pre-op Instructions Given /Understood          _________________________________  ? Directions to Surgery Center                          _________________________________  ? Transportation Home_______________      __________________________________  ? Crutches/Walker__________________        __________________________________    Orders: Hard copy/ EPIC                 Transcribed/ EPIC              _______Wt.    ________Pharmacy 
Patient received from O.R., sleepy, VSS.   
Pre and post operative expectations and routines explained to patient, verbalized understanding.  
Pt left ambulatory in stable condition  
Reported off to Sabrina.  Patient VSS.  Ready for discharge.    
no

## 2024-07-08 NOTE — ANESTHESIA PRE PROCEDURE
Department of Anesthesiology  Preprocedure Note       Name:  Helga Chakraborty   Age:  59 y.o.  :  1965                                          MRN:  [id number]         Date:  2024      Surgeon: Surgeon(s):  Malissa Jason DO    Procedure: Procedure(s):  RIGHT MAXILLARY ANTROSTOMY WITH TISSUE REMOVAL, SEPTOPLASTY, IMAGE NAVIGATION    Medications prior to admission:   Prior to Admission medications    Medication Sig Start Date End Date Taking? Authorizing Provider   EPINEPHrine (EPIPEN 2-MADONNA) 0.3 MG/0.3ML SOAJ injection Inject 0.3 mLs into the muscle as needed Use as directed for allergic reaction   Yes Provider, MD Jesus   Pseudoephedrine HCl (SUDAFED PO) Take by mouth daily   Yes Provider, MD Jesus   MAGNESIUM PO Take by mouth daily   Yes ProviderJesus MD   Psyllium (METAMUCIL PO) Take by mouth as needed   Yes Provider, MD Jesus   fexofenadine (ALLEGRA) 180 MG tablet Take 1 tablet by mouth daily   Yes ProviderJesus MD   Naproxen Sodium (ALEVE PO) Take by mouth as needed   Yes Provider, MD Jesus   Probiotic Product (PROBIOTIC PO) Take by mouth daily   Yes Provider, MD Jesus   IBUPROFEN PO Take by mouth as needed   Yes Provider, MD Jesus   montelukast (SINGULAIR) 10 MG tablet TAKE ONE TABLET BY MOUTH EVERY EVENING 24   Aurelio Sanchez MD   albuterol sulfate HFA (VENTOLIN HFA) 108 (90 Base) MCG/ACT inhaler INHALE TWO PUFFS BY MOUTH EVERY 6 HOURS AS NEEDED FOR WHEEZING OR FOR SHORTNESS OF BREATH 3/14/24   Aurelio Sanchez MD   DUPIXENT 200 MG/1.14ML SOPN injection Inject 1.14 mLs into the skin every 14 days 24   Aurelio Sanchez MD   budesonide-formoterol (SYMBICORT) 160-4.5 MCG/ACT AERO INHALE TWO PUFFS BY MOUTH TWICE A DAY 23   Aurelio Sanchez MD   Azelastine HCl 137 MCG/SPRAY SOLN SPRAY TWO SPRAYS IN EACH NOSTRIL TWICE DAILY AS NEEDED FOR RHINITIS 23   Aurelio Sanchez MD   Cholecalciferol (VITAMIN D3) 50 MCG (2000 UT) TABS Take 1 tablet by mouth

## 2024-07-10 NOTE — TELEPHONE ENCOUNTER
[facility] called the office leaving a  asking for status update on patient's medication. They gave a call back number of [phone].

## 2024-07-12 ENCOUNTER — OFFICE VISIT (OUTPATIENT)
Dept: ENT CLINIC | Age: 59
End: 2024-07-12

## 2024-07-12 VITALS
RESPIRATION RATE: 16 BRPM | SYSTOLIC BLOOD PRESSURE: 152 MMHG | WEIGHT: 170 LBS | BODY MASS INDEX: 28.32 KG/M2 | HEIGHT: 65 IN | HEART RATE: 101 BPM | DIASTOLIC BLOOD PRESSURE: 85 MMHG

## 2024-07-12 DIAGNOSIS — J32.0 CHRONIC MAXILLARY SINUSITIS: Primary | ICD-10-CM

## 2024-07-12 DIAGNOSIS — J34.2 DEVIATED NASAL SEPTUM: ICD-10-CM

## 2024-07-12 NOTE — PROGRESS NOTES
DATE OF PROCEDURE:  09/06/2019

 

PREOPERATIVE DIAGNOSIS:   Right inguinal hernia.

 

POSTOPERATIVE DIAGNOSIS:  Right inguinal hernia, (indirect).

 

PROCEDURE:  Robotic-assisted laparoscopic right inguinal hernia repair with

ProGrip mesh.

 

SURGEON:  Dr. Leo Gosselin.

 

ASSISTANT:  Flor Woodard NP (Provided instrument exchange, mesh placement,

abdominal wall trocar placement and closure of abdominal wall ports).

 

ANESTHESIA:  General.

 

ESTIMATED BLOOD LOSS:  Minimal.

 

FLUIDS:  Crystalloid.

 

DISPOSITION:  Patient was taken to recovery room awake, alert, hemodynamically

stable.

 

DESCRIPTION OF PROCEDURE:  The patient was taken to operative room and was given

general anesthesia.  After adequate anesthesia and preoperative antibiotics were

given, the patient was prepped and draped in the usual sterile fashion.

 

Next a supraumbilical incision was made with skin knife.  Blunt dissection was

carried down to fascia.  Fascia was entered using Veress needle, insufflated to

15 mm of pressure. Dilating 8 mm trocar was placed and two lateral 8 mm trocars

were placed under direct visualization.  Next the patient was placed in steep

Trendelenburg and the robot was docked.  The peritoneum was taken down over the

right inguinal area and next the scissors as well as blunt dissection was used to

mobilize the hernia sac out of the inguinal canal.  A great deal of this was

removed but this extended down almost to the top of the testicle/epididymis area

and I was able to dissect it down but where it started getting towards the

epididymis, I then transected the hernia sac with cautery and then mobilize this

off the cord structures and the vessels.  The Octavio's ligament as well as pubis

was well defined and the mesh was cut to the appropriate size and placed in the

preperitoneal space and pressed into position.

 

The peritoneum was closed over the top of this using #3-0 V-Loc suture and the

peritoneal defect was closed with #3-0 V-Loc as well.  All trocars were removed

under direct visualization. #4-0 was used close all incisions.  Steri-Strips and

a dry sterile dressing was applied.  The patient was awakened, extubated, brought

to recovery room awake, alert, hemodynamically stable.  Sponge and needle counts

correct times two. [facility]  [address], [address]  [place] [address]  P: [phone]       Patient     Helga Chakraborty  1965    ChiefComplaint     Chief Complaint   Patient presents with    Post-Op Check     Patient states that she is having headaches with nausea, patient feels tired.        History of Present Illness     Helga is a 59-year-old female 1 week status post right maxillary antrostomy and septoplasty with removal of tissue for chronic odontogenic sinusitis.  Recovery going well except for headache, diarrhea and feeling of fatigue.  Using nasal saline spray but has not been rinsing the nose.    Past Medical History     Past Medical History:   Diagnosis Date    Anemia     Asthma     Chronic sinus infection     COPD (chronic obstructive pulmonary disease) (HCC)     Cyst of eye     right    Hiatal hernia     History of blood transfusion     Hyperlipidemia     Migraines     Pulmonary nodule        Past Surgical History     Past Surgical History:   Procedure Laterality Date    DENTAL SURGERY      lower teeth extraction    HERNIA REPAIR      hiatal    SINUS ENDOSCOPY N/A 2024    RIGHT MAXILLARY ANTROSTOMY WITH TISSUE REMOVAL, SEPTOPLASTY, IMAGE NAVIGATION performed by Malissa Jason DO at [facility] OR    WISDOM TOOTH EXTRACTION         Family History     Family History   Problem Relation Age of Onset    Breast Cancer Mother 58    Cancer Father         skin    Asthma Father        Social History     Social History     Tobacco Use    Smoking status: Former     Current packs/day: 0.00     Average packs/day: 1 pack/day for 35.0 years (35.0 ttl pk-yrs)     Types: Cigarettes     Start date:      Quit date: 2020     Years since quittin.5     Passive exposure: Past    Smokeless tobacco: Never   Vaping Use    Vaping Use: Former    Quit date: 2015    Substances: Nicotine, Flavoring   Substance Use Topics    Alcohol use: Yes     Comment: 0-2 drinks per month    Drug use: No        Allergies

## 2024-07-12 NOTE — TELEPHONE ENCOUNTER
Patient called to say that [facility] is not the right pharmacy for insurance for her Dupixent. She said that the right pharmacy is [facility] and the number is [phone].

## 2024-07-12 NOTE — TELEPHONE ENCOUNTER
No emails have been received. Informed patient. She will be calling dupixent again. Gave her our fax number for dupixent to send paperwork to.

## 2024-07-26 ENCOUNTER — OFFICE VISIT (OUTPATIENT)
Dept: ENT CLINIC | Age: 59
End: 2024-07-26
Payer: COMMERCIAL

## 2024-07-26 VITALS
RESPIRATION RATE: 16 BRPM | HEIGHT: 65 IN | SYSTOLIC BLOOD PRESSURE: 102 MMHG | WEIGHT: 170 LBS | DIASTOLIC BLOOD PRESSURE: 73 MMHG | HEART RATE: 102 BPM | BODY MASS INDEX: 28.32 KG/M2

## 2024-07-26 DIAGNOSIS — J32.0 CHRONIC MAXILLARY SINUSITIS: Primary | ICD-10-CM

## 2024-07-26 PROCEDURE — 31237 NSL/SINS NDSC SURG BX POLYPC: CPT | Performed by: OTOLARYNGOLOGY

## 2024-07-26 NOTE — PROGRESS NOTES
[facility]  [address], [address]  [place] [address]  P: [phone]       Patient     Helga Chakraborty  1965    ChiefComplaint     Chief Complaint   Patient presents with    Other     Patient is being seen for a post op, patient states that she is having a lot of drainage and cough       History of Present Illness     June is a 59-year-old female here today for 3-week follow-up status post right maxillary antrostomy.  Since last seen patient states she got a cold, symptoms are improving.  Has been using nasal saline spray but no rinses.    Past Medical History     Past Medical History:   Diagnosis Date    Anemia     Asthma     Chronic sinus infection     COPD (chronic obstructive pulmonary disease) (HCC)     Cyst of eye     right    Hiatal hernia     History of blood transfusion     Hyperlipidemia     Migraines     Pulmonary nodule        Past Surgical History     Past Surgical History:   Procedure Laterality Date    DENTAL SURGERY      lower teeth extraction    HERNIA REPAIR      hiatal    SINUS ENDOSCOPY N/A 2024    RIGHT MAXILLARY ANTROSTOMY WITH TISSUE REMOVAL, SEPTOPLASTY, IMAGE NAVIGATION performed by Malissa Jason DO at [facility] OR    WISDOM TOOTH EXTRACTION         Family History     Family History   Problem Relation Age of Onset    Breast Cancer Mother 58    Cancer Father         skin    Asthma Father        Social History     Social History     Tobacco Use    Smoking status: Former     Current packs/day: 0.00     Average packs/day: 1 pack/day for 35.0 years (35.0 ttl pk-yrs)     Types: Cigarettes     Start date:      Quit date: 2020     Years since quittin.5     Passive exposure: Past    Smokeless tobacco: Never   Vaping Use    Vaping Use: Former    Quit date: 2015    Substances: Nicotine, Flavoring   Substance Use Topics    Alcohol use: Yes     Comment: 0-2 drinks per month    Drug use: No        Allergies     Allergies   Allergen Reactions    Codeine Hives

## 2024-08-01 NOTE — TELEPHONE ENCOUNTER
Patient will be contacting billing and getting issue resolved. She switched insurance plans and should have better copays now. She will contact the office if she needs anything.

## 2024-08-10 ENCOUNTER — HOSPITAL ENCOUNTER (OUTPATIENT)
Dept: CT IMAGING | Age: 59
Discharge: HOME | End: 2024-08-10
Payer: COMMERCIAL

## 2024-08-10 DIAGNOSIS — Z87.891 PERSONAL HISTORY OF TOBACCO USE: ICD-10-CM

## 2024-08-10 PROCEDURE — 71271 CT THORAX LUNG CANCER SCR C-: CPT

## 2024-08-24 ENCOUNTER — HOSPITAL ENCOUNTER (OUTPATIENT)
Age: 59
Discharge: HOME | End: 2024-08-24
Payer: COMMERCIAL

## 2024-08-24 ENCOUNTER — HOSPITAL ENCOUNTER (OUTPATIENT)
Dept: GENERAL RADIOLOGY | Age: 59
Discharge: HOME | End: 2024-08-24
Payer: COMMERCIAL

## 2024-08-24 DIAGNOSIS — M54.50 CHRONIC RIGHT-SIDED LOW BACK PAIN WITHOUT SCIATICA: ICD-10-CM

## 2024-08-24 DIAGNOSIS — G89.29 CHRONIC RIGHT-SIDED LOW BACK PAIN WITHOUT SCIATICA: ICD-10-CM

## 2024-08-24 PROCEDURE — 72110 X-RAY EXAM L-2 SPINE 4/>VWS: CPT

## 2024-09-22 DIAGNOSIS — J30.2 SEASONAL ALLERGIES: ICD-10-CM

## 2024-09-23 ENCOUNTER — TELEPHONE (OUTPATIENT)
Dept: ENT CLINIC | Age: 59
End: 2024-09-23

## 2024-09-23 RX ORDER — MONTELUKAST SODIUM 10 MG/1
TABLET ORAL
Qty: 90 TABLET | Refills: 5 | Status: SHIPPED | OUTPATIENT
Start: 2024-09-23

## 2024-09-24 DIAGNOSIS — J30.9 ALLERGIC RHINITIS, UNSPECIFIED SEASONALITY, UNSPECIFIED TRIGGER: Primary | ICD-10-CM

## 2024-10-01 ENCOUNTER — OFFICE VISIT (OUTPATIENT)
Dept: PULMONOLOGY | Age: 59
End: 2024-10-01
Payer: COMMERCIAL

## 2024-10-01 VITALS
OXYGEN SATURATION: 97 % | BODY MASS INDEX: 29.32 KG/M2 | HEART RATE: 96 BPM | HEIGHT: 65 IN | DIASTOLIC BLOOD PRESSURE: 82 MMHG | WEIGHT: 176 LBS | SYSTOLIC BLOOD PRESSURE: 126 MMHG

## 2024-10-01 DIAGNOSIS — Z87.891 PERSONAL HISTORY OF TOBACCO USE: Primary | ICD-10-CM

## 2024-10-01 DIAGNOSIS — J30.2 SEASONAL ALLERGIES: ICD-10-CM

## 2024-10-01 DIAGNOSIS — J30.9 ALLERGIC RHINITIS, UNSPECIFIED SEASONALITY, UNSPECIFIED TRIGGER: ICD-10-CM

## 2024-10-01 DIAGNOSIS — J44.9 COPD, MILD (HCC): ICD-10-CM

## 2024-10-01 PROCEDURE — 99214 OFFICE O/P EST MOD 30 MIN: CPT | Performed by: INTERNAL MEDICINE

## 2024-10-01 PROCEDURE — G0296 VISIT TO DETERM LDCT ELIG: HCPCS | Performed by: INTERNAL MEDICINE

## 2024-10-01 RX ORDER — AZELASTINE HYDROCHLORIDE 137 UG/1
2 SPRAY, METERED NASAL 2 TIMES DAILY
Qty: 30 ML | Refills: 5 | Status: SHIPPED | OUTPATIENT
Start: 2024-10-01

## 2024-10-01 NOTE — PROGRESS NOTES
dry. No nodule on exposed extremities.   Lymph: No cervical LAD. No supraclavicular LAD.   M/S: No cyanosis. No joint deformity. No clubbing.   Neuro: Awake. Alert. Moves all four extremities.   Psych: Oriented x 3. No anxiety.                 DATA reviewed by me:   PFTs 01/27/2020 FVC 3.71(103%) FEV1 2.54(90%) FEV1/FVC 69% TLC 5.72(105%) DLCO 18.26 (74%) 6MW 1140 F LO2 97%     CT chest 1/22/2020  Mediastinum: Limited evaluation without IV contrast.  No mediastinal  adenopathy is appreciated.  Central airways appear patent.  Lungs/pleura: Mild scattered emphysematous changes are noted.  Innumerable  small cysts are seen scattered in the lung parenchyma bilaterally.  No  pleural effusion or focal intrapulmonary dense consolidative process.  Within  the right lower lobe there is a noncalcified round pulmonary nodule which  measures 1.1 x 1.2 x 1.2 cm, series 3, image 71.  Upper Abdomen: Surgical changes noted at the EG junction.  No acute finding  appreciated.  Soft Tissues/Bones: No axillary adenopathy appreciated.  No suspicious bony  lesion appreciated.    PET scan 1/31/2020   Mild metabolic activity in the right lower lobe nodule, SUV 2.95    CT chest 5/15/2020  Stable 1.2 cm right lower lobe nodule    CT chest 8/14/2020   Stable RLL nodule    CT chest 2/15/2021  Stable right lower lobe nodule    CT chest 8/15/2021  Stable right lower lobe nodule    CT chest 8/20/22   Emphysema with stable nodules     CT chest 8/25/2023  Unchanged solid bilateral pulmonary nodules      CT chest 8/10/2024 imaging reviewed by me and showed  12 x 11 mm nodule, right lower lobe, image 76, stable.  Several other tiny nodules are also stable bilaterally measuring no greater  than 2 mm.  No new or suspicious nodule.  Potentially Significant Incidentals (Lung-RADS Category S): None  Pulmonary incidentals:  Mild centrilobular emphysema.  Other incidentals:  Moderate hiatal hernia.        1/22/20: Vascular endothelial G factor 49    Labs

## 2024-10-03 ENCOUNTER — TELEPHONE (OUTPATIENT)
Dept: ENT CLINIC | Age: 59
End: 2024-10-03

## 2024-10-25 DIAGNOSIS — J44.9 COPD, MILD (HCC): ICD-10-CM

## 2024-10-25 RX ORDER — BUDESONIDE AND FORMOTEROL FUMARATE DIHYDRATE 160; 4.5 UG/1; UG/1
AEROSOL RESPIRATORY (INHALATION)
Qty: 30.6 G | Refills: 1 | Status: SHIPPED | OUTPATIENT
Start: 2024-10-25

## 2024-11-11 ENCOUNTER — TELEPHONE (OUTPATIENT)
Dept: ENT CLINIC | Age: 59
End: 2024-11-11

## 2024-11-11 NOTE — TELEPHONE ENCOUNTER
Pt scheduled for allergy testing on Thursday 11/14. She's been off her allergy meds for about a week and can really feel it. Her sinus congestion is really bad and asks if she can take sudafed? If not what can she take?

## 2024-11-14 ENCOUNTER — NURSE ONLY (OUTPATIENT)
Dept: ENT CLINIC | Age: 59
End: 2024-11-14
Payer: COMMERCIAL

## 2024-11-14 PROCEDURE — 95024 IQ TESTS W/ALLERGENIC XTRCS: CPT | Performed by: STUDENT IN AN ORGANIZED HEALTH CARE EDUCATION/TRAINING PROGRAM

## 2024-11-14 PROCEDURE — 95004 PERQ TESTS W/ALRGNC XTRCS: CPT | Performed by: STUDENT IN AN ORGANIZED HEALTH CARE EDUCATION/TRAINING PROGRAM

## 2024-11-14 NOTE — PROGRESS NOTES
Allergy Testing Note      After obtaining consent, and per orders of Dr Jason, injections of allergy serum given per documentation below by YFN Moss.      Test Information  Consent: Yes  Time Antigens Placed: 1030  Location: Arm  Allergen : ALK Myles  Testing Nurse: YFN Moss  Reviewing Physician: Eren  Amount Injected (mL): 0.01  Select Antigens: Select    Controls  Negative 0.05% Glycerine-Saline: Not tested  Positive Histamine 1 mg/ml: Not tested    Trees  Brant: Not tested  Paper Birch: Not tested  [place]: 3+  Red [place]: 0  American Elm: 0  Clayton: Not tested  Shagbark [place]: Not tested  Sugar Maple: Not tested  Red [place]: 0  Bur [place]: Not tested  Gail: Not tested  Eastern [place]: Not tested  Eastern American Colton: 3+  Black Cripple Creek: Not tested  Black Ros[place]ale: Not tested  White Miguel: 0  Red Birch: 0  Ardmore Eastern: 0  Green Lake/Pecan Mix: 3+  Red Maple: 3+  White Oak: 0    Others  American House Dust Mite: 3+  Dog Dander: 3+  Cat Dander: 3+  Feather (Mixed): 3+  Cockroach: 3+    Grasses  Ky Bluegrass: Not tested  Smooth Brome: Not tested  Canary Grass Blue: Not tested  Willard Fescue: Not tested  Willard Fescue: 0  Italian Rye: Not tested  Joseph: 3+  Bermuda: 3+  Dharmesh: 0    Weeds  Cocklebur: Not tested  Lamb's Quarter: 0  Burweed Common: Not tested  Mugwort: Not tested  English Plantain: 3+  Giant Ragweed: Not tested  Short Ragweed: Not tested  Sheep Sorrel: 0  Kochia: 0  Red Root Pigweed: 3+ (Rough Pigweed)  Mixed Ragweed: 3+    Molds/Fungi  Alternaria Hormodendrum: 0  Dreschlera: Not tested  Epicoccum Nigrum: 0  Epidermorphyto Floccosum: Not tested  Fusarium Moniliforme: Not tested  Fusarium Solani: Not tested  Geotrichum Candidum: Not tested  Gliocladium: Not tested  Deliquescence: Not tested  Spondylocladium: Not tested  Atrovirens Microsporum: Not tested  Phoma Betae: Not tested  Rhizopus Oryzae: Not tested  Rhodotorula: 3+  Saccharomyces Cerevisiae : Not

## 2024-11-22 ENCOUNTER — TELEPHONE (OUTPATIENT)
Dept: ENT CLINIC | Age: 59
End: 2024-11-22

## 2024-11-22 ENCOUNTER — OFFICE VISIT (OUTPATIENT)
Dept: ENT CLINIC | Age: 59
End: 2024-11-22
Payer: COMMERCIAL

## 2024-11-22 VITALS
SYSTOLIC BLOOD PRESSURE: 139 MMHG | BODY MASS INDEX: 29.32 KG/M2 | HEIGHT: 65 IN | WEIGHT: 176 LBS | DIASTOLIC BLOOD PRESSURE: 87 MMHG | HEART RATE: 85 BPM

## 2024-11-22 DIAGNOSIS — J30.89 ALLERGIC RHINITIS DUE TO FUNGAL SPORES, UNSPECIFIED SEASONALITY: Primary | ICD-10-CM

## 2024-11-22 PROCEDURE — 99213 OFFICE O/P EST LOW 20 MIN: CPT | Performed by: OTOLARYNGOLOGY

## 2024-11-22 ASSESSMENT — ENCOUNTER SYMPTOMS
COUGH: 0
TROUBLE SWALLOWING: 0
SORE THROAT: 0
APNEA: 0
SHORTNESS OF BREATH: 0
EYE ITCHING: 0
VOICE CHANGE: 0
SINUS PRESSURE: 1
FACIAL SWELLING: 0

## 2024-11-22 NOTE — PROGRESS NOTES
head: No submental or submandibular adenopathy.      Left side of head: No submental or submandibular adenopathy.      Cervical: No cervical adenopathy.      Right cervical: No superficial, deep or posterior cervical adenopathy.     Left cervical: No superficial, deep or posterior cervical adenopathy.   Skin:     General: Skin is warm and dry.   Neurological:      Mental Status: She is alert and oriented to person, place, and time.      Cranial Nerves: No cranial nerve deficit.      Coordination: Coordination normal.      Gait: Gait normal.   Psychiatric:         Thought Content: Thought content normal.           Assessment and Plan     1. Allergic rhinitis due to fungal spores, unspecified seasonality  Allergy test results explained.  Significant allergies would benefit from immunotherapy has had symptomatic improvement with this but has had it previously.  Going to call insurance to determine her out-of-pocket cost.  She will call back if she wishes to proceed.  Advised her to get dust mite covers.      Malissa Jason,   11/22/24      Portions of this note were dictated using Dragon. There may be linguistic errors secondary to the use of this program.

## 2024-11-22 NOTE — TELEPHONE ENCOUNTER
Patient called and had questions regarding the PA for allergy testing and and allergy injections. Patient is requesting a call back.

## 2024-11-25 ENCOUNTER — TELEPHONE (OUTPATIENT)
Dept: ENT CLINIC | Age: 59
End: 2024-11-25

## 2024-11-25 DIAGNOSIS — Z29.89 IMMUNOTHERAPY: Primary | ICD-10-CM

## 2024-11-25 DIAGNOSIS — J30.81 ALLERGIC RHINITIS DUE TO ANIMAL HAIR AND DANDER: ICD-10-CM

## 2024-11-25 RX ORDER — EPINEPHRINE 0.3 MG/.3ML
0.3 INJECTION SUBCUTANEOUS ONCE
Qty: 0.3 ML | Refills: 0 | Status: SHIPPED | OUTPATIENT
Start: 2024-11-25 | End: 2024-11-25

## 2024-11-25 NOTE — TELEPHONE ENCOUNTER
Pt was seen on Friday but needed to talk with insurance before starting allergy injections. Pt stated she is ready to start them and needs an epipen. Please advise.

## 2024-12-12 ENCOUNTER — NURSE ONLY (OUTPATIENT)
Dept: ENT CLINIC | Age: 59
End: 2024-12-12
Payer: COMMERCIAL

## 2024-12-12 DIAGNOSIS — J30.9 ALLERGIC RHINITIS, UNSPECIFIED SEASONALITY, UNSPECIFIED TRIGGER: Primary | ICD-10-CM

## 2024-12-12 PROCEDURE — 95165 ANTIGEN THERAPY SERVICES: CPT | Performed by: STUDENT IN AN ORGANIZED HEALTH CARE EDUCATION/TRAINING PROGRAM

## 2024-12-12 NOTE — PROGRESS NOTES
Total vials prepared: 2. First vial contains Pollens with 5ml and second vial contains Mites, Molds, Epithelia, and Insects (M,M,Epi,I) with 5ml.  Allergy extract was prepared according to written prescription by provider.  Provider onsite during allergy extract preparation.  Patient vials labeled with name, date of birth, vial number, Pollen or M M Epi I, and expiration date.  Injections are given weekly according to provider orders and injections are built according to patient tolerance to achieve a goal of maintenance.  See media scan for Prescription Treatment Set. See allergy flowsheets for injection documentation from each visit.

## 2024-12-13 DIAGNOSIS — J44.9 COPD, MILD (HCC): ICD-10-CM

## 2024-12-16 RX ORDER — ALBUTEROL SULFATE 90 UG/1
INHALANT RESPIRATORY (INHALATION)
Qty: 54 G | Refills: 1 | Status: SHIPPED | OUTPATIENT
Start: 2024-12-16

## 2024-12-17 ENCOUNTER — NURSE ONLY (OUTPATIENT)
Dept: ENT CLINIC | Age: 59
End: 2024-12-17
Payer: COMMERCIAL

## 2024-12-17 DIAGNOSIS — J30.9 ALLERGIC RHINITIS, UNSPECIFIED SEASONALITY, UNSPECIFIED TRIGGER: Primary | ICD-10-CM

## 2024-12-17 PROCEDURE — 95117 IMMUNOTHERAPY INJECTIONS: CPT | Performed by: OTOLARYNGOLOGY

## 2024-12-17 NOTE — PROGRESS NOTES
Correct serum vials verified by patient and nurse. Consent obtained and SVT completed.  SVT P=9mm wheal, M=7mm wheal.  After obtaining consent, and per orders of Dr Jason, injections of allergy serum given by YFN Moss. Patient instructed to remain in clinic for 30 minutes after injection, and to report any adverse reactions to me immediately. No change in patient status post injection.    Patient instructed on use of EpiPen, signs of allergic reaction, and rationale for bringing EpiPen to each appointment.  Patient verbalized understanding and provided accurate return demonstration of EpiPen use with  pen.

## 2024-12-24 ENCOUNTER — NURSE ONLY (OUTPATIENT)
Dept: ENT CLINIC | Age: 59
End: 2024-12-24
Payer: COMMERCIAL

## 2024-12-24 DIAGNOSIS — J30.9 ALLERGIC RHINITIS, UNSPECIFIED SEASONALITY, UNSPECIFIED TRIGGER: Primary | ICD-10-CM

## 2024-12-24 PROCEDURE — 95117 IMMUNOTHERAPY INJECTIONS: CPT | Performed by: OTOLARYNGOLOGY

## 2024-12-24 NOTE — PROGRESS NOTES
Correct serum vials verified by patient and nurse. After obtaining consent, and per orders of Dr Jasno, injections of allergy serum given by YFN Moss. Patient instructed to remain in clinic for 30 minutes after injection, and to report any adverse reactions to me immediately. No change in patient status post injection.

## 2024-12-31 ENCOUNTER — NURSE ONLY (OUTPATIENT)
Dept: ENT CLINIC | Age: 59
End: 2024-12-31
Payer: COMMERCIAL

## 2024-12-31 DIAGNOSIS — J30.9 ALLERGIC RHINITIS, UNSPECIFIED SEASONALITY, UNSPECIFIED TRIGGER: Primary | ICD-10-CM

## 2024-12-31 PROCEDURE — 95117 IMMUNOTHERAPY INJECTIONS: CPT | Performed by: OTOLARYNGOLOGY

## 2024-12-31 NOTE — PROGRESS NOTES
Correct serum vials verified by patient and nurse. After obtaining consent, and per orders of Dr Jason, injections of allergy serum given by YFN Moss. Patient instructed to remain in clinic for 30 minutes after injection, and to report any adverse reactions to me immediately. No change in patient status post injection.

## 2025-01-14 ENCOUNTER — NURSE ONLY (OUTPATIENT)
Dept: ENT CLINIC | Age: 60
End: 2025-01-14
Payer: COMMERCIAL

## 2025-01-14 DIAGNOSIS — J30.9 ALLERGIC RHINITIS, UNSPECIFIED SEASONALITY, UNSPECIFIED TRIGGER: Primary | ICD-10-CM

## 2025-01-14 PROCEDURE — 95117 IMMUNOTHERAPY INJECTIONS: CPT | Performed by: OTOLARYNGOLOGY

## 2025-01-14 NOTE — PROGRESS NOTES
Correct serum vials verified by patient and nurse. After obtaining consent, and per orders of Dr Jason, injections of allergy serum given by YFN Moss. Last injection dose repeated.  It has been 2 weeks since patient has had an injection.  Patient instructed to remain in clinic for 30 minutes after injection, and to report any adverse reactions to me immediately. No change in patient status post injection.

## 2025-01-23 ENCOUNTER — NURSE ONLY (OUTPATIENT)
Dept: ENT CLINIC | Age: 60
End: 2025-01-23
Payer: COMMERCIAL

## 2025-01-23 DIAGNOSIS — J30.9 ALLERGIC RHINITIS, UNSPECIFIED SEASONALITY, UNSPECIFIED TRIGGER: Primary | ICD-10-CM

## 2025-01-23 PROCEDURE — 95117 IMMUNOTHERAPY INJECTIONS: CPT | Performed by: STUDENT IN AN ORGANIZED HEALTH CARE EDUCATION/TRAINING PROGRAM

## 2025-01-24 ENCOUNTER — TELEPHONE (OUTPATIENT)
Dept: ENT CLINIC | Age: 60
End: 2025-01-24

## 2025-01-24 ENCOUNTER — NURSE ONLY (OUTPATIENT)
Dept: ENT CLINIC | Age: 60
End: 2025-01-24
Payer: COMMERCIAL

## 2025-01-24 DIAGNOSIS — J30.9 ALLERGIC RHINITIS, UNSPECIFIED SEASONALITY, UNSPECIFIED TRIGGER: Primary | ICD-10-CM

## 2025-01-24 PROCEDURE — 95165 ANTIGEN THERAPY SERVICES: CPT | Performed by: OTOLARYNGOLOGY

## 2025-01-24 NOTE — PROGRESS NOTES
Total vials prepared: 2. First vial contains Pollens with 12 doses and second vial contains Mites, Molds, Epithelia, and Insects (M,M,Epi,I) with 6 doses.  Allergy extract was prepared according to written prescription by provider.  Provider onsite during allergy extract preparation.  Patient vials labeled with name, date of birth, vial number, Pollen or M M Epi I, and expiration date.  Injections are given weekly according to provider orders and injections are built according to patient tolerance to achieve a goal of maintenance.  See media scan for Prescription Treatment Set. See allergy flowsheets for injection documentation from each visit.

## 2025-01-28 ENCOUNTER — OFFICE VISIT (OUTPATIENT)
Dept: ORTHOPEDIC SURGERY | Age: 60
End: 2025-01-28
Payer: COMMERCIAL

## 2025-01-28 VITALS — WEIGHT: 176 LBS | HEIGHT: 65 IN | BODY MASS INDEX: 29.32 KG/M2

## 2025-01-28 DIAGNOSIS — Z82.61 FAMILY HISTORY OF RHEUMATOID ARTHRITIS: ICD-10-CM

## 2025-01-28 DIAGNOSIS — G89.29 CHRONIC LOW BACK PAIN, UNSPECIFIED BACK PAIN LATERALITY, UNSPECIFIED WHETHER SCIATICA PRESENT: Primary | ICD-10-CM

## 2025-01-28 DIAGNOSIS — M54.50 CHRONIC LOW BACK PAIN, UNSPECIFIED BACK PAIN LATERALITY, UNSPECIFIED WHETHER SCIATICA PRESENT: Primary | ICD-10-CM

## 2025-01-28 DIAGNOSIS — M25.50 ARTHRALGIA OF MULTIPLE JOINTS: ICD-10-CM

## 2025-01-28 PROCEDURE — 99204 OFFICE O/P NEW MOD 45 MIN: CPT | Performed by: ORTHOPAEDIC SURGERY

## 2025-01-28 RX ORDER — CELECOXIB 200 MG/1
200 CAPSULE ORAL 2 TIMES DAILY
Qty: 60 CAPSULE | Refills: 3 | Status: SHIPPED | OUTPATIENT
Start: 2025-01-28

## 2025-01-28 NOTE — PROGRESS NOTES
HFA (PROVENTIL;VENTOLIN;PROAIR) 108 (90 Base) MCG/ACT inhaler INHALE 2 PUFFS BY MOUTH EVERY 6 HOURS AS NEEDED FOR WHEEZING OR FOR SHORTNESS OF BREATH 54 g 1    EPINEPHrine (EPIPEN 2-MADONNA) 0.3 MG/0.3ML SOAJ injection Inject 0.3 mLs into the muscle once for 1 dose Use as directed for allergic reaction 0.3 mL 0    Cranberry 125 MG TABS Take 1 tablet by mouth nightly      budesonide-formoterol (SYMBICORT) 160-4.5 MCG/ACT AERO INHALE 2 PUFFS BY MOUTH TWICE A DAY 30.6 g 1    Azelastine HCl 137 MCG/SPRAY SOLN 2 sprays by Nasal route in the morning and at bedtime 30 mL 5    montelukast (SINGULAIR) 10 MG tablet TAKE ONE TABLET BY MOUTH EVERY EVENING 90 tablet 5    EPINEPHrine (EPIPEN 2-MADONNA) 0.3 MG/0.3ML SOAJ injection Inject 0.3 mLs into the muscle as needed Use as directed for allergic reaction      Pseudoephedrine HCl (SUDAFED PO) Take by mouth daily      MAGNESIUM PO Take by mouth daily      Psyllium (METAMUCIL PO) Take by mouth as needed      fexofenadine (ALLEGRA) 180 MG tablet Take 1 tablet by mouth daily      Naproxen Sodium (ALEVE PO) Take by mouth as needed      Probiotic Product (PROBIOTIC PO) Take by mouth daily      IBUPROFEN PO Take by mouth as needed      DUPIXENT 200 MG/1.14ML SOPN injection Inject 1.14 mLs into the skin every 14 days 2 each 5    Cholecalciferol (VITAMIN D3) 50 MCG (2000 UT) TABS Take 1 tablet by mouth daily      atorvastatin (LIPITOR) 40 MG tablet Take 1 tablet by mouth nightly      aspirin 81 MG chewable tablet Take 1 tablet by mouth nightly      traZODone (DESYREL) 50 MG tablet Take 1 tablet by mouth nightly as needed for Sleep      Multiple Vitamin (MULTI-VITAMIN DAILY PO) Take by mouth daily       No current facility-administered medications for this visit.       Social    Social History     Socioeconomic History    Marital status:      Spouse name: Not on file    Number of children: Not on file    Years of education: Not on file    Highest education level: Not on file

## 2025-02-01 ENCOUNTER — HOSPITAL ENCOUNTER (OUTPATIENT)
Age: 60
Discharge: HOME | End: 2025-02-01
Payer: COMMERCIAL

## 2025-02-01 DIAGNOSIS — Z82.61 FAMILY HISTORY OF RHEUMATOID ARTHRITIS: ICD-10-CM

## 2025-02-01 DIAGNOSIS — M54.50 CHRONIC LOW BACK PAIN, UNSPECIFIED BACK PAIN LATERALITY, UNSPECIFIED WHETHER SCIATICA PRESENT: ICD-10-CM

## 2025-02-01 DIAGNOSIS — M25.50 ARTHRALGIA OF MULTIPLE JOINTS: ICD-10-CM

## 2025-02-01 DIAGNOSIS — G89.29 CHRONIC LOW BACK PAIN, UNSPECIFIED BACK PAIN LATERALITY, UNSPECIFIED WHETHER SCIATICA PRESENT: ICD-10-CM

## 2025-02-01 LAB
ALBUMIN SERPL-MCNC: 4 G/DL (ref 3.4–5)
ALBUMIN/GLOB SERPL: 1.4 {RATIO} (ref 1.1–2.2)
ALP SERPL-CCNC: 54 U/L (ref 40–129)
ALT SERPL-CCNC: 25 U/L (ref 10–40)
ANION GAP SERPL CALCULATED.3IONS-SCNC: 10 MMOL/L (ref 3–16)
AST SERPL-CCNC: 26 U/L (ref 15–37)
BASOPHILS # BLD: 0 K/UL (ref 0–0.2)
BASOPHILS NFR BLD: 0.7 %
BILIRUB SERPL-MCNC: 0.4 MG/DL (ref 0–1)
BUN SERPL-MCNC: 14 MG/DL (ref 7–20)
CALCIUM SERPL-MCNC: 9.6 MG/DL (ref 8.3–10.6)
CHLORIDE SERPL-SCNC: 105 MMOL/L (ref 99–110)
CO2 SERPL-SCNC: 26 MMOL/L (ref 21–32)
CREAT SERPL-MCNC: 0.7 MG/DL (ref 0.6–1.1)
CRP SERPL-MCNC: <3 MG/L (ref 0–5.1)
DEPRECATED RDW RBC AUTO: 14 % (ref 12.4–15.4)
EOSINOPHIL # BLD: 0.1 K/UL (ref 0–0.6)
EOSINOPHIL NFR BLD: 3.5 %
ERYTHROCYTE [SEDIMENTATION RATE] IN BLOOD BY WESTERGREN METHOD: 20 MM/HR (ref 0–30)
GFR SERPLBLD CREATININE-BSD FMLA CKD-EPI: >90 ML/MIN/{1.73_M2}
GLUCOSE SERPL-MCNC: 114 MG/DL (ref 70–99)
HCT VFR BLD AUTO: 42 % (ref 36–48)
HGB BLD-MCNC: 13.7 G/DL (ref 12–16)
LYMPHOCYTES # BLD: 2 K/UL (ref 1–5.1)
LYMPHOCYTES NFR BLD: 52.9 %
MCH RBC QN AUTO: 28.6 PG (ref 26–34)
MCHC RBC AUTO-ENTMCNC: 32.5 G/DL (ref 31–36)
MCV RBC AUTO: 87.9 FL (ref 80–100)
MONOCYTES # BLD: 0.3 K/UL (ref 0–1.3)
MONOCYTES NFR BLD: 9 %
NEUTROPHILS # BLD: 1.3 K/UL (ref 1.7–7.7)
NEUTROPHILS NFR BLD: 33.9 %
PLATELET # BLD AUTO: 245 K/UL (ref 135–450)
PMV BLD AUTO: 8.8 FL (ref 5–10.5)
POTASSIUM SERPL-SCNC: 3.8 MMOL/L (ref 3.5–5.1)
PROT SERPL-MCNC: 6.8 G/DL (ref 6.4–8.2)
RBC # BLD AUTO: 4.78 M/UL (ref 4–5.2)
SODIUM SERPL-SCNC: 141 MMOL/L (ref 136–145)
T3FREE SERPL-MCNC: 3.5 PG/ML (ref 2.3–4.2)
T4 FREE SERPL-MCNC: 1.3 NG/DL (ref 0.9–1.8)
WBC # BLD AUTO: 3.8 K/UL (ref 4–11)

## 2025-02-01 PROCEDURE — 84155 ASSAY OF PROTEIN SERUM: CPT

## 2025-02-01 PROCEDURE — 80053 COMPREHEN METABOLIC PANEL: CPT

## 2025-02-01 PROCEDURE — 85652 RBC SED RATE AUTOMATED: CPT

## 2025-02-01 PROCEDURE — 84481 FREE ASSAY (FT-3): CPT

## 2025-02-01 PROCEDURE — 84165 PROTEIN E-PHORESIS SERUM: CPT

## 2025-02-01 PROCEDURE — 86039 ANTINUCLEAR ANTIBODIES (ANA): CPT

## 2025-02-01 PROCEDURE — 84439 ASSAY OF FREE THYROXINE: CPT

## 2025-02-01 PROCEDURE — 36415 COLL VENOUS BLD VENIPUNCTURE: CPT

## 2025-02-01 PROCEDURE — 86038 ANTINUCLEAR ANTIBODIES: CPT

## 2025-02-01 PROCEDURE — 86140 C-REACTIVE PROTEIN: CPT

## 2025-02-01 PROCEDURE — 85025 COMPLETE CBC W/AUTO DIFF WBC: CPT

## 2025-02-02 LAB — ANA SER QL IA: POSITIVE

## 2025-02-03 LAB
ALBUMIN SERPL ELPH-MCNC: 3.1 G/DL (ref 3.1–4.9)
ALPHA1 GLOB SERPL ELPH-MCNC: 0.4 G/DL (ref 0.2–0.4)
ALPHA2 GLOB SERPL ELPH-MCNC: 0.9 G/DL (ref 0.4–1.1)
B-GLOBULIN SERPL ELPH-MCNC: 1.5 G/DL (ref 0.9–1.6)
GAMMA GLOB SERPL ELPH-MCNC: 1 G/DL (ref 0.6–1.8)
PROT SERPL-MCNC: 6.8 G/DL (ref 6.4–8.2)
SPE/IFE INTERPRETATION: NORMAL

## 2025-02-04 ENCOUNTER — NURSE ONLY (OUTPATIENT)
Dept: ENT CLINIC | Age: 60
End: 2025-02-04
Payer: COMMERCIAL

## 2025-02-04 DIAGNOSIS — J30.9 ALLERGIC RHINITIS, UNSPECIFIED SEASONALITY, UNSPECIFIED TRIGGER: Primary | ICD-10-CM

## 2025-02-04 PROCEDURE — 95117 IMMUNOTHERAPY INJECTIONS: CPT | Performed by: OTOLARYNGOLOGY

## 2025-02-05 LAB
ANTINUCLEAR AB INTERPRETIVE COMMENT: NORMAL
NUCLEAR IGG SER QL IF: NORMAL

## 2025-02-20 ENCOUNTER — HOSPITAL ENCOUNTER (EMERGENCY)
Age: 60
Discharge: HOME | End: 2025-02-20
Payer: COMMERCIAL

## 2025-02-20 ENCOUNTER — APPOINTMENT (OUTPATIENT)
Dept: GENERAL RADIOLOGY | Age: 60
End: 2025-02-20
Payer: COMMERCIAL

## 2025-02-20 VITALS
OXYGEN SATURATION: 98 % | WEIGHT: 179.4 LBS | RESPIRATION RATE: 20 BRPM | SYSTOLIC BLOOD PRESSURE: 142 MMHG | HEIGHT: 65 IN | HEART RATE: 103 BPM | TEMPERATURE: 99.5 F | BODY MASS INDEX: 29.89 KG/M2 | DIASTOLIC BLOOD PRESSURE: 84 MMHG

## 2025-02-20 DIAGNOSIS — J44.1 COPD EXACERBATION (HCC): ICD-10-CM

## 2025-02-20 DIAGNOSIS — J40 BRONCHITIS: Primary | ICD-10-CM

## 2025-02-20 LAB
FLUAV RNA RESP QL NAA+PROBE: NOT DETECTED
FLUBV RNA RESP QL NAA+PROBE: NOT DETECTED
SARS-COV-2 RNA RESP QL NAA+PROBE: NOT DETECTED

## 2025-02-20 PROCEDURE — 87636 SARSCOV2 & INF A&B AMP PRB: CPT

## 2025-02-20 PROCEDURE — 6370000000 HC RX 637 (ALT 250 FOR IP): Performed by: PHYSICIAN ASSISTANT

## 2025-02-20 PROCEDURE — 71046 X-RAY EXAM CHEST 2 VIEWS: CPT

## 2025-02-20 PROCEDURE — 99284 EMERGENCY DEPT VISIT MOD MDM: CPT

## 2025-02-20 RX ORDER — BENZONATATE 100 MG/1
100 CAPSULE ORAL 3 TIMES DAILY PRN
Qty: 30 CAPSULE | Refills: 0 | Status: SHIPPED | OUTPATIENT
Start: 2025-02-20 | End: 2025-03-02

## 2025-02-20 RX ORDER — PREDNISONE 50 MG/1
50 TABLET ORAL DAILY
Qty: 5 TABLET | Refills: 0 | Status: SHIPPED | OUTPATIENT
Start: 2025-02-20 | End: 2025-02-25

## 2025-02-20 RX ORDER — ACETAMINOPHEN 325 MG/1
650 TABLET ORAL ONCE
Status: COMPLETED | OUTPATIENT
Start: 2025-02-20 | End: 2025-02-20

## 2025-02-20 RX ADMIN — ACETAMINOPHEN 650 MG: 325 TABLET ORAL at 12:59

## 2025-02-20 ASSESSMENT — PAIN DESCRIPTION - PAIN TYPE: TYPE: ACUTE PAIN

## 2025-02-20 ASSESSMENT — PAIN SCALES - GENERAL
PAINLEVEL_OUTOF10: 7
PAINLEVEL_OUTOF10: 5

## 2025-02-20 ASSESSMENT — PAIN DESCRIPTION - LOCATION
LOCATION: CHEST;RIB CAGE
LOCATION: GENERALIZED

## 2025-02-20 ASSESSMENT — PAIN - FUNCTIONAL ASSESSMENT: PAIN_FUNCTIONAL_ASSESSMENT: 0-10

## 2025-02-20 NOTE — ED PROVIDER NOTES
[facility] EMERGENCY DEPARTMENT  EMERGENCY DEPARTMENT ENCOUNTER        Pt Name: Helga Chakraborty  MRN: [id number]  Birthdate 1965  Date of evaluation: 2/20/2025  Provider: CORINNE VALENTE PA-C  PCP: Naya Ford APRN - CNP  ED Attending: MD Carrie      AI. I have evaluated this patient.      CHIEF COMPLAINT:     Chief Complaint   Patient presents with    Shortness of Breath     Cold/flu sx for 2 weeks. Patient reports increased shortness of breath and pain with swallowing and coughing.        HISTORY OF PRESENT ILLNESS:      History provided by the patient. No limitations.    Helga Chakraborty is a 59 y.o. female who arrives to the ED by private vehicle.  Patient describes cold/flu symptoms for the last 2 weeks.  She has been congested, coughing, describes postnasal drip and sore throat.  She has a history of COPD, quit smoking 4 to 5 years ago.  She sees pulmonology, Dr. Sanchez.  Despite having pulmonology and primary care follow-up, she has made no efforts to get an appointment with her doctors.  She states she was hoping she could ride it out but is now here due to persistent symptoms.    Nursing Notes were reviewed     REVIEW OF SYSTEMS:     Review of Systems  Positives and pertinent negatives as per HPI.      PAST MEDICAL HISTORY:     Past Medical History:   Diagnosis Date    Anemia     Asthma     Chronic sinus infection     COPD (chronic obstructive pulmonary disease) (HCC)     Cyst of eye     right    Hiatal hernia     History of blood transfusion     Hyperlipidemia     Migraines     Pulmonary nodule        SURGICAL HISTORY:      Past Surgical History:   Procedure Laterality Date    DENTAL SURGERY      lower teeth extraction    HERNIA REPAIR      hiatal    SINUS ENDOSCOPY N/A 7/8/2024    RIGHT MAXILLARY ANTROSTOMY WITH TISSUE REMOVAL, SEPTOPLASTY, IMAGE NAVIGATION performed by Malissa Jason DO at [facility] OR    WISDOM TOOTH EXTRACTION         CURRENT MEDICATIONS:       Discharge Medication List

## 2025-02-20 NOTE — ED NOTES
Patient requiring apple sauce to take tylenol; successful PO medication administration.   1L of PO fluids given to patient to drink while in lobby waiting for results.

## 2025-02-24 DIAGNOSIS — J44.1 COPD EXACERBATION (HCC): Primary | ICD-10-CM

## 2025-02-24 RX ORDER — DOXYCYCLINE HYCLATE 100 MG
100 TABLET ORAL 2 TIMES DAILY
Qty: 10 TABLET | Refills: 0 | Status: SHIPPED | OUTPATIENT
Start: 2025-02-24 | End: 2025-03-01

## 2025-02-24 RX ORDER — PREDNISONE 10 MG/1
TABLET ORAL
Qty: 30 TABLET | Refills: 0 | Status: SHIPPED | OUTPATIENT
Start: 2025-02-24 | End: 2025-03-08

## 2025-02-24 NOTE — TELEPHONE ENCOUNTER
Pt was treating in the Er on 02/20/24 and was diagnosied with Bronchitis. Pt stated they didn't get her the normal Medication that her Provider usually give her. She was wanting to know if she could get an Antibiotic.     Do you have the following symptoms?  Shortness of Breath  y   Wheezing  y  Cough  y  Cough Characteristics:  Productive    yes   Sputum Color    yellow/white  Hemoptysis   n  Consistency of sputum   every time she coughs     Fever:    n    Temp:97.1  Chills/Sweats:  n/n  What other symptoms are you having?:  none     How long have you had these symptoms?   Since wed.      Pharmacy: [facility]          Review medications and allergies:        Allergies?  Yes         Currently on Antibiotics?  (Drug/Dose/Frequency and how long on?) n        Systemic Steroids?  (Drug/Dose/Frequency and how long on?)  Prednisone 50mg      Last sick call taken on 01/18/25.  Meds prescribed were Doxycycline Hyclate, by Laura.    Last OV 10/01/24 with Dr. Sanchez   (pull in last visit note assessment/plan)      Assessment:       Mild COPD with asthma component   Allergic rhinitis- much better controlled   Chronic maxillary sinusitis- followed by ENT post debridement   Seasonal allergy- trees, plants, mold, yeast, dust mites, cat  RLL 1.2 cm lung nodule stable since 1/22/2020.  Mild activity on PET scan 1/31/2020 with SUV of 2.95.  Evaluated by CT surgeon.  Stable on repeat CT chest, most recent 8/25/23. Favoring granuloma.  Cystic lung disease. DDx [facility] and [facility].  Vascular endothelial growth factor D normal 1/22/20   Anti-IL4 Duplixent stopped Jan 2024 due to cost   35-pack-year history of smoking- quit Jan 1 2020       Plan:       Continue Symbicort BID and Albuterol 2 puffs Q4-6 hrs PRN  Continue Singulair   Continue Astelin BID PRN- refills   CT chest, low dose protocol, screening for lung cancer August 2025. Risks, benefits and alternatives including doing nothing were discussed with patient.  Advised to continue

## 2025-02-25 ENCOUNTER — NURSE ONLY (OUTPATIENT)
Dept: ENT CLINIC | Age: 60
End: 2025-02-25
Payer: COMMERCIAL

## 2025-02-25 DIAGNOSIS — J30.9 ALLERGIC RHINITIS, UNSPECIFIED SEASONALITY, UNSPECIFIED TRIGGER: Primary | ICD-10-CM

## 2025-02-25 PROCEDURE — 95117 IMMUNOTHERAPY INJECTIONS: CPT | Performed by: OTOLARYNGOLOGY

## 2025-02-25 NOTE — PROGRESS NOTES
Correct serum vials verified by patient and nurse. After obtaining consent, and per orders of Dr Jason, injections of allergy serum given by YFN Moss. Injection dose decreased by 1.  It has been 3 weeks since patient has had an injection.  Patient instructed to remain in clinic for 30 minutes after injection, and to report any adverse reactions to me immediately. No change in patient status post injection.

## 2025-03-04 ENCOUNTER — NURSE ONLY (OUTPATIENT)
Dept: ENT CLINIC | Age: 60
End: 2025-03-04
Payer: COMMERCIAL

## 2025-03-04 DIAGNOSIS — J30.9 ALLERGIC RHINITIS, UNSPECIFIED SEASONALITY, UNSPECIFIED TRIGGER: Primary | ICD-10-CM

## 2025-03-04 PROCEDURE — 95117 IMMUNOTHERAPY INJECTIONS: CPT | Performed by: OTOLARYNGOLOGY

## 2025-03-11 ENCOUNTER — NURSE ONLY (OUTPATIENT)
Dept: ENT CLINIC | Age: 60
End: 2025-03-11
Payer: COMMERCIAL

## 2025-03-11 DIAGNOSIS — J30.9 ALLERGIC RHINITIS, UNSPECIFIED SEASONALITY, UNSPECIFIED TRIGGER: Primary | ICD-10-CM

## 2025-03-11 PROCEDURE — 95117 IMMUNOTHERAPY INJECTIONS: CPT | Performed by: OTOLARYNGOLOGY

## 2025-03-18 ENCOUNTER — NURSE ONLY (OUTPATIENT)
Dept: ENT CLINIC | Age: 60
End: 2025-03-18
Payer: COMMERCIAL

## 2025-03-18 DIAGNOSIS — J30.81 ALLERGIC RHINITIS DUE TO ANIMAL HAIR AND DANDER: Primary | ICD-10-CM

## 2025-03-18 PROCEDURE — 95117 IMMUNOTHERAPY INJECTIONS: CPT | Performed by: OTOLARYNGOLOGY

## 2025-03-18 NOTE — PROGRESS NOTES
Correct serum vials verified by patient and nurse. Consent obtained and SVT completed.  SVT P=9mm wheal, M=8mm wheal.  After obtaining consent, and per orders of Dr Jason, injections of allergy serum given by MICHAELA Fleix. Patient instructed to remain in clinic for 30 minutes after injection, and to report any adverse reactions to me immediately. No change in patient status post injection.

## 2025-03-27 ENCOUNTER — CLINICAL SUPPORT (OUTPATIENT)
Dept: ENT CLINIC | Age: 60
End: 2025-03-27
Payer: COMMERCIAL

## 2025-03-27 DIAGNOSIS — J30.81 ALLERGIC RHINITIS DUE TO ANIMAL HAIR AND DANDER: Primary | ICD-10-CM

## 2025-03-27 PROCEDURE — 95117 IMMUNOTHERAPY INJECTIONS: CPT | Performed by: OTOLARYNGOLOGY

## 2025-03-27 NOTE — PROGRESS NOTES
Correct serum vials verified by patient and nurse. After obtaining consent, and per orders of Dr Jason, injections of allergy serum given by MICHAELA Felix. Patient instructed to remain in clinic for 30 minutes after injection, and to report any adverse reactions to me immediately. No change in patient status post injection.

## 2025-04-02 ENCOUNTER — OFFICE VISIT (OUTPATIENT)
Dept: PULMONOLOGY | Age: 60
End: 2025-04-02
Payer: COMMERCIAL

## 2025-04-02 ENCOUNTER — TELEPHONE (OUTPATIENT)
Dept: ENT CLINIC | Age: 60
End: 2025-04-02

## 2025-04-02 VITALS
SYSTOLIC BLOOD PRESSURE: 134 MMHG | RESPIRATION RATE: 18 BRPM | WEIGHT: 188 LBS | HEART RATE: 97 BPM | OXYGEN SATURATION: 97 % | BODY MASS INDEX: 31.32 KG/M2 | HEIGHT: 65 IN | DIASTOLIC BLOOD PRESSURE: 82 MMHG

## 2025-04-02 DIAGNOSIS — J30.9 ALLERGIC RHINITIS, UNSPECIFIED SEASONALITY, UNSPECIFIED TRIGGER: ICD-10-CM

## 2025-04-02 DIAGNOSIS — Z87.891 PERSONAL HISTORY OF TOBACCO USE: ICD-10-CM

## 2025-04-02 DIAGNOSIS — J44.1 COPD EXACERBATION (HCC): Primary | ICD-10-CM

## 2025-04-02 PROCEDURE — 99214 OFFICE O/P EST MOD 30 MIN: CPT | Performed by: INTERNAL MEDICINE

## 2025-04-02 RX ORDER — PREDNISONE 20 MG/1
20 TABLET ORAL DAILY
Qty: 5 TABLET | Refills: 0 | Status: SHIPPED | OUTPATIENT
Start: 2025-04-02 | End: 2025-04-07

## 2025-04-02 RX ORDER — FLUTICASONE FUROATE, UMECLIDINIUM BROMIDE AND VILANTEROL TRIFENATATE 200; 62.5; 25 UG/1; UG/1; UG/1
1 POWDER RESPIRATORY (INHALATION) DAILY
Qty: 1 EACH | Refills: 3 | Status: SHIPPED | OUTPATIENT
Start: 2025-04-02

## 2025-04-02 NOTE — PROGRESS NOTES
(Lung-RADS Category S): None  Pulmonary incidentals:  Mild centrilobular emphysema.  Other incidentals:  Moderate hiatal hernia.    CXR 2/20/25 images independently reviewed by me and showed:  No acute cardiopulmonary disease       1/22/20: Vascular endothelial G factor 49    Labs 4/2/22   IgE 66     Assessment:       Mild COPD with asthma component with mild exacerbation   Allergic rhinitis  Chronic maxillary sinusitis- followed by ENT post debridement   Seasonal allergy- trees, plants, mold, yeast, dust mites, cat  RLL 1.2 cm lung nodule stable since 1/22/2020.  Mild activity on PET scan 1/31/2020 with SUV of 2.95.  Evaluated by CT surgeon.  Stable on repeat CT chest, most recent 8/25/23. Favoring granuloma.  Cystic lung disease. DDx [facility] and [facility].  Vascular endothelial growth factor D normal 1/22/20   Anti-IL4 Duplixent stopped Jan 2024 due to cost   35-pack-year history of smoking- quit Jan 1 2020       Plan:       Prednisone 20 mg po daily for 5 days   Change Symbicort BID ---> Trelegy   Continue Albuterol 2 puffs Q4-6 hrs PRN  Continue Singulair   Continue Astelin BID PRN  Allergy shots per allergist   CT chest, low dose protocol, screening for lung cancer August 2025.   Advised to continue with smoking cessation  Follow up after CT chest

## 2025-04-02 NOTE — TELEPHONE ENCOUNTER
Patient is a patient of Dr. Jason who is out of the office.    Patient did not complete M,M,Epi,I build up (B/U-2) before it expires and will need to repeat it. Patient has been tolerating allergy immunotherapy injections without incident.  Recommend rebuilding Prescription Treatment Set. Recommend continuing on the Escalated Build Up.  Please advise.

## 2025-04-10 ENCOUNTER — TELEPHONE (OUTPATIENT)
Dept: ENT CLINIC | Age: 60
End: 2025-04-10

## 2025-04-10 ENCOUNTER — CLINICAL SUPPORT (OUTPATIENT)
Dept: ENT CLINIC | Age: 60
End: 2025-04-10
Payer: COMMERCIAL

## 2025-04-10 DIAGNOSIS — J30.9 ALLERGIC RHINITIS, UNSPECIFIED SEASONALITY, UNSPECIFIED TRIGGER: Primary | ICD-10-CM

## 2025-04-10 PROCEDURE — 95117 IMMUNOTHERAPY INJECTIONS: CPT | Performed by: STUDENT IN AN ORGANIZED HEALTH CARE EDUCATION/TRAINING PROGRAM

## 2025-04-10 NOTE — TELEPHONE ENCOUNTER
Typically for ear pressure we recommend Flonase and Zyrtec.  She should also try to do the Valsalva and pop your ears on a regular basis.  I would try this for a week or 2 and if this does not improve I would recommend an audiogram to see if the eustachian tube function is normal

## 2025-04-10 NOTE — TELEPHONE ENCOUNTER
Pt called in (Dr. Jason pt) R-ear is clogged has a lot of pressure. Air tunnel like sound in ear upon waking this morning  instead of the usual watery sound in ear. Pt requesting any OTC medication advisement or if an appointment is needed?

## 2025-04-10 NOTE — PROGRESS NOTES
Correct serum vials verified by patient and nurse. After obtaining consent, and per orders of Dr Jason, injections of allergy serum given by Blanco WILSON. Last injection dose repeated.  It has been 2 weeks since patient has had an injection.  Patient instructed to remain in clinic for 30 minutes after injection, and to report any adverse reactions to me immediately. No change in patient status post injection.

## 2025-04-15 DIAGNOSIS — J30.9 ALLERGIC RHINITIS, UNSPECIFIED SEASONALITY, UNSPECIFIED TRIGGER: ICD-10-CM

## 2025-04-15 DIAGNOSIS — J30.2 SEASONAL ALLERGIES: ICD-10-CM

## 2025-04-15 RX ORDER — AZELASTINE HYDROCHLORIDE 137 UG/1
2 SPRAY, METERED NASAL 2 TIMES DAILY
Qty: 30 ML | Refills: 5 | Status: SHIPPED | OUTPATIENT
Start: 2025-04-15

## 2025-04-24 ENCOUNTER — CLINICAL SUPPORT (OUTPATIENT)
Dept: ENT CLINIC | Age: 60
End: 2025-04-24
Payer: COMMERCIAL

## 2025-04-24 ENCOUNTER — NURSE ONLY (OUTPATIENT)
Dept: ENT CLINIC | Age: 60
End: 2025-04-24

## 2025-04-24 DIAGNOSIS — J30.81 ALLERGIC RHINITIS DUE TO ANIMAL HAIR AND DANDER: Primary | ICD-10-CM

## 2025-04-24 DIAGNOSIS — J30.9 ALLERGIC RHINITIS, UNSPECIFIED SEASONALITY, UNSPECIFIED TRIGGER: Primary | ICD-10-CM

## 2025-04-24 PROCEDURE — 95117 IMMUNOTHERAPY INJECTIONS: CPT | Performed by: STUDENT IN AN ORGANIZED HEALTH CARE EDUCATION/TRAINING PROGRAM

## 2025-04-24 PROCEDURE — 95165 ANTIGEN THERAPY SERVICES: CPT | Performed by: STUDENT IN AN ORGANIZED HEALTH CARE EDUCATION/TRAINING PROGRAM

## 2025-04-30 ENCOUNTER — CLINICAL SUPPORT (OUTPATIENT)
Dept: ENT CLINIC | Age: 60
End: 2025-04-30
Payer: COMMERCIAL

## 2025-04-30 DIAGNOSIS — J30.81 ALLERGIC RHINITIS DUE TO ANIMAL HAIR AND DANDER: Primary | ICD-10-CM

## 2025-04-30 PROCEDURE — 95117 IMMUNOTHERAPY INJECTIONS: CPT | Performed by: STUDENT IN AN ORGANIZED HEALTH CARE EDUCATION/TRAINING PROGRAM

## 2025-04-30 NOTE — PROGRESS NOTES
Correct serum vials verified by patient and nurse. Consent obtained and SVT completed.  SVT  M=7mm wheal.  After obtaining consent, and per orders of Dr Jason, injections of allergy serum given by MICHAELA Felix. Patient instructed to remain in clinic for 30 minutes after injection, and to report any adverse reactions to me immediately. No change in patient status post injection.

## 2025-05-01 ENCOUNTER — TELEMEDICINE (OUTPATIENT)
Dept: PULMONOLOGY | Age: 60
End: 2025-05-01
Payer: COMMERCIAL

## 2025-05-01 DIAGNOSIS — J44.1 COPD EXACERBATION (HCC): Primary | ICD-10-CM

## 2025-05-01 DIAGNOSIS — J40 TRACHEOBRONCHITIS: ICD-10-CM

## 2025-05-01 DIAGNOSIS — J30.9 ALLERGIC RHINITIS, UNSPECIFIED SEASONALITY, UNSPECIFIED TRIGGER: ICD-10-CM

## 2025-05-01 PROCEDURE — 99214 OFFICE O/P EST MOD 30 MIN: CPT | Performed by: INTERNAL MEDICINE

## 2025-05-01 RX ORDER — DOXYCYCLINE HYCLATE 100 MG
100 TABLET ORAL 2 TIMES DAILY
Qty: 10 TABLET | Refills: 0 | Status: SHIPPED | OUTPATIENT
Start: 2025-05-01 | End: 2025-05-06

## 2025-05-01 RX ORDER — PREDNISONE 20 MG/1
20 TABLET ORAL DAILY
Qty: 5 TABLET | Refills: 0 | Status: SHIPPED | OUTPATIENT
Start: 2025-05-01 | End: 2025-05-06

## 2025-05-01 NOTE — PROGRESS NOTES
indicated above. If you are not or unsure, please re-schedule the visit: Yes, I confirm.      Total time spent for this encounter: Not billed by time    --Aurelio Sanchez MD on 5/1/2025 at 9:05 AM    An electronic signature was used to authenticate this note.

## 2025-05-06 ENCOUNTER — TELEPHONE (OUTPATIENT)
Dept: PULMONOLOGY | Age: 60
End: 2025-05-06

## 2025-05-06 DIAGNOSIS — J44.9 COPD, MILD (HCC): Primary | ICD-10-CM

## 2025-05-06 RX ORDER — PREDNISONE 10 MG/1
TABLET ORAL
Qty: 30 TABLET | Refills: 0 | Status: SHIPPED | OUTPATIENT
Start: 2025-05-06 | End: 2025-05-18

## 2025-05-06 NOTE — TELEPHONE ENCOUNTER
Pt called in stating she feels like she may need stronger medication to help with her exacerbation. She states she is coughing so much that she is losing her voice. She states she is not coughing up as much sputum, but it is thicker harder to bring up. Still has a tan color. She just finished her medications this morning.

## 2025-05-06 NOTE — TELEPHONE ENCOUNTER
Patient informed and aware of prescriptions and also recommendation that if she does not improve to go to the ER.

## 2025-05-08 ENCOUNTER — HOSPITAL ENCOUNTER (EMERGENCY)
Age: 60
Discharge: HOME | End: 2025-05-08
Payer: COMMERCIAL

## 2025-05-08 ENCOUNTER — APPOINTMENT (OUTPATIENT)
Dept: GENERAL RADIOLOGY | Age: 60
End: 2025-05-08
Payer: COMMERCIAL

## 2025-05-08 VITALS
WEIGHT: 190 LBS | OXYGEN SATURATION: 100 % | SYSTOLIC BLOOD PRESSURE: 151 MMHG | HEART RATE: 85 BPM | HEIGHT: 65 IN | RESPIRATION RATE: 17 BRPM | DIASTOLIC BLOOD PRESSURE: 84 MMHG | BODY MASS INDEX: 31.65 KG/M2 | TEMPERATURE: 98.1 F

## 2025-05-08 DIAGNOSIS — J45.998 CHRONIC ALLERGIC BRONCHITIS: Primary | ICD-10-CM

## 2025-05-08 DIAGNOSIS — J30.2 SEASONAL ALLERGIC RHINITIS, UNSPECIFIED TRIGGER: ICD-10-CM

## 2025-05-08 LAB
ALBUMIN SERPL-MCNC: 4 G/DL (ref 3.4–5)
ALBUMIN/GLOB SERPL: 1.4 {RATIO} (ref 1.1–2.2)
ALP SERPL-CCNC: 73 U/L (ref 40–129)
ALT SERPL-CCNC: 24 U/L (ref 10–40)
ANION GAP SERPL CALCULATED.3IONS-SCNC: 12 MMOL/L (ref 3–16)
AST SERPL-CCNC: 19 U/L (ref 15–37)
BASOPHILS # BLD: 0 K/UL (ref 0–0.2)
BASOPHILS NFR BLD: 0.2 %
BILIRUB SERPL-MCNC: 0.4 MG/DL (ref 0–1)
BUN SERPL-MCNC: 17 MG/DL (ref 7–20)
CALCIUM SERPL-MCNC: 9.9 MG/DL (ref 8.3–10.6)
CHLORIDE SERPL-SCNC: 102 MMOL/L (ref 99–110)
CO2 SERPL-SCNC: 25 MMOL/L (ref 21–32)
CREAT SERPL-MCNC: 0.9 MG/DL (ref 0.6–1.1)
DEPRECATED RDW RBC AUTO: 15.2 % (ref 12.4–15.4)
EKG ATRIAL RATE: 89 BPM
EKG DIAGNOSIS: NORMAL
EKG P AXIS: 55 DEGREES
EKG P-R INTERVAL: 124 MS
EKG Q-T INTERVAL: 368 MS
EKG QRS DURATION: 68 MS
EKG QTC CALCULATION (BAZETT): 447 MS
EKG R AXIS: 30 DEGREES
EKG T AXIS: 42 DEGREES
EKG VENTRICULAR RATE: 89 BPM
EOSINOPHIL # BLD: 0 K/UL (ref 0–0.6)
EOSINOPHIL NFR BLD: 0 %
GFR SERPLBLD CREATININE-BSD FMLA CKD-EPI: 74 ML/MIN/{1.73_M2}
GLUCOSE SERPL-MCNC: 194 MG/DL (ref 70–99)
HCT VFR BLD AUTO: 42.5 % (ref 36–48)
HGB BLD-MCNC: 14.1 G/DL (ref 12–16)
LYMPHOCYTES # BLD: 0.9 K/UL (ref 1–5.1)
LYMPHOCYTES NFR BLD: 10.2 %
MAGNESIUM SERPL-MCNC: 2.24 MG/DL (ref 1.8–2.4)
MCH RBC QN AUTO: 28.9 PG (ref 26–34)
MCHC RBC AUTO-ENTMCNC: 33.1 G/DL (ref 31–36)
MCV RBC AUTO: 87.3 FL (ref 80–100)
MONOCYTES # BLD: 0.1 K/UL (ref 0–1.3)
MONOCYTES NFR BLD: 1.3 %
NEUTROPHILS # BLD: 7.9 K/UL (ref 1.7–7.7)
NEUTROPHILS NFR BLD: 88.3 %
PLATELET # BLD AUTO: 312 K/UL (ref 135–450)
PMV BLD AUTO: 7.6 FL (ref 5–10.5)
POTASSIUM SERPL-SCNC: 4.2 MMOL/L (ref 3.5–5.1)
PROT SERPL-MCNC: 6.9 G/DL (ref 6.4–8.2)
RBC # BLD AUTO: 4.87 M/UL (ref 4–5.2)
SODIUM SERPL-SCNC: 139 MMOL/L (ref 136–145)
TROPONIN, HIGH SENSITIVITY: 7 NG/L (ref 0–14)
WBC # BLD AUTO: 9 K/UL (ref 4–11)

## 2025-05-08 PROCEDURE — 80053 COMPREHEN METABOLIC PANEL: CPT

## 2025-05-08 PROCEDURE — 85025 COMPLETE CBC W/AUTO DIFF WBC: CPT

## 2025-05-08 PROCEDURE — 84484 ASSAY OF TROPONIN QUANT: CPT

## 2025-05-08 PROCEDURE — 6360000002 HC RX W HCPCS: Performed by: PHYSICIAN ASSISTANT

## 2025-05-08 PROCEDURE — 2500000003 HC RX 250 WO HCPCS: Performed by: PHYSICIAN ASSISTANT

## 2025-05-08 PROCEDURE — 93005 ELECTROCARDIOGRAM TRACING: CPT | Performed by: EMERGENCY MEDICINE

## 2025-05-08 PROCEDURE — 93010 ELECTROCARDIOGRAM REPORT: CPT | Performed by: INTERNAL MEDICINE

## 2025-05-08 PROCEDURE — 71046 X-RAY EXAM CHEST 2 VIEWS: CPT

## 2025-05-08 PROCEDURE — 83735 ASSAY OF MAGNESIUM: CPT

## 2025-05-08 PROCEDURE — 99285 EMERGENCY DEPT VISIT HI MDM: CPT

## 2025-05-08 PROCEDURE — 96374 THER/PROPH/DIAG INJ IV PUSH: CPT

## 2025-05-08 RX ADMIN — WATER 125 MG: 1 INJECTION INTRAMUSCULAR; INTRAVENOUS; SUBCUTANEOUS at 16:01

## 2025-05-08 ASSESSMENT — PAIN - FUNCTIONAL ASSESSMENT: PAIN_FUNCTIONAL_ASSESSMENT: 0-10

## 2025-05-08 ASSESSMENT — PAIN DESCRIPTION - LOCATION: LOCATION: RIB CAGE

## 2025-05-08 ASSESSMENT — PAIN SCALES - GENERAL: PAINLEVEL_OUTOF10: 4

## 2025-05-08 NOTE — ED PROVIDER NOTES
[facility] EMERGENCY DEPARTMENT  EMERGENCY DEPARTMENT ENCOUNTER        Pt Name: Helga Chakraborty  MRN: [id number]  Birthdate 1965  Date of evaluation: 5/8/2025  Provider: Antoine Fuentes PA-C  PCP: Naya Ford APRN - CNP  Note Started: 4:57 PM EDT 5/8/25      AI. I have evaluated this patient.        CHIEF COMPLAINT       Chief Complaint   Patient presents with    Cough     Pt reports cough for about 3 weeks, pt has finished abx and on second round of steroids, pt reports thick sputum still and reports she is hurting in ribs and stomach muscles from coughing so much       HISTORY OF PRESENT ILLNESS: 1 or more Elements     History From: Patient    Limitations to history : None    Social Determinants Significantly Affecting Health : None    Chief Complaint: Cough and congestion with history of COPD/asthmatic bronchitis    Helga Chakraborty is a 59 y.o. female who presents to the ED complaining of ongoing cough and drainage.  Patient has extensive history of seasonal allergic rhinitis.  She also has history of COPD/asthmatic bronchitis.  Onset of this episode was about 3 weeks ago.  She has seen her pulmonologist about this and has completed 1 prednisone series and is currently on a prednisone taper without resolution.  Patient was told to go to the ED.  Patient takes multiple antihistamines and other allergy medications.  She reports nonproductive cough, although sometimes she has clear sputum coming up.  She admits to rhinorrhea and nasal congestion.  She also admits to postnasal drainage and scratchy throat.  She denies otalgia, change in hearing or vision, discharge from eyes or ears, fever, rash, nausea, vomiting, chest pain or difficulty breathing, wheezing, orthopnea, palpitations, back pain, abdominal pain, paresthesias, change in bowels or urine, or any additional systemic or neurologic complaints    Nursing Notes were all reviewed and agreed with or any disagreements were addressed in the  HPI.    REVIEW OF SYSTEMS :      Review of Systems    Positives and Pertinent negatives as per HPI.     SURGICAL HISTORY     Past Surgical History:   Procedure Laterality Date    DENTAL SURGERY      lower teeth extraction    HERNIA REPAIR      hiatal    SINUS ENDOSCOPY N/A 7/8/2024    RIGHT MAXILLARY ANTROSTOMY WITH TISSUE REMOVAL, SEPTOPLASTY, IMAGE NAVIGATION performed by Malissa Jason DO at [facility] OR    WISDOM TOOTH EXTRACTION         CURRENTMEDICATIONS       Discharge Medication List as of 5/8/2025  5:04 PM        CONTINUE these medications which have NOT CHANGED    Details   predniSONE (DELTASONE) 10 MG tablet Take 40 mg by mouth for 3 days 30 mg for 3 days 20 mg for 3 days 10 mg for 3 days., Disp-30 tablet, R-0Normal      azelastine (ASTEPRO) 137 MCG/SPRAY nasal spray 2 sprays by Nasal route in the morning and at bedtime, Disp-30 mL, R-5Normal      fluticasone-umeclidin-vilant (TRELEGY ELLIPTA) 200-62.5-25 MCG/ACT AEPB inhaler Inhale 1 puff into the lungs daily, Disp-1 each, R-3Normal      celecoxib (CELEBREX) 200 MG capsule Take 1 capsule by mouth 2 times daily, Disp-60 capsule, R-3Normal      tiZANidine (ZANAFLEX) 4 MG tablet Take 1 tablet by mouth 3 times daily as needed (3 times a day), Disp-30 tablet, R-0Normal      albuterol sulfate HFA (PROVENTIL;VENTOLIN;PROAIR) 108 (90 Base) MCG/ACT inhaler INHALE 2 PUFFS BY MOUTH EVERY 6 HOURS AS NEEDED FOR WHEEZING OR FOR SHORTNESS OF BREATH, Disp-54 g, R-1Normal      Cranberry 125 MG TABS Take 1 tablet by mouth nightlyHistorical Med      montelukast (SINGULAIR) 10 MG tablet TAKE ONE TABLET BY MOUTH EVERY EVENING, Disp-90 tablet, R-5Normal      EPINEPHrine (EPIPEN 2-MADONNA) 0.3 MG/0.3ML SOAJ injection Inject 0.3 mLs into the muscle as needed Use as directed for allergic reactionHistorical Med      Pseudoephedrine HCl (SUDAFED PO) Take by mouth dailyHistorical Med      MAGNESIUM PO Take by mouth dailyHistorical Med      Psyllium (METAMUCIL PO) Take by mouth as

## 2025-05-08 NOTE — ED PROVIDER NOTES
I was not asked to see this patient.  I was available for consultation if deemed necessary.  I was only asked to interpret the EKG.  Please see YG Fuentes's note for care of the patient while in the emergency department and for final disposition.    The Ekg interpreted by me shows  normal sinus rhythm with a rate of 89  Axis is   Normal  QTc is  within an acceptable range  Intervals and Durations are unremarkable.      ST Segments: no acute change and normal  No significant change from prior EKG dated - 5/19/24  No STEMI, occasional baseline artifact noted in lead V1 but overall EKG appears similar to prior EKG          Nikita Clarke MD  05/08/25 [id number]

## 2025-05-08 NOTE — DISCHARGE INSTRUCTIONS
Continue to take your allergy medications.  Finish your prednisone.  Follow-up with family doctor or pulmonologist for ongoing care and management.  Return immediately for new complaints or worsening

## 2025-05-15 ENCOUNTER — CLINICAL SUPPORT (OUTPATIENT)
Dept: ENT CLINIC | Age: 60
End: 2025-05-15
Payer: COMMERCIAL

## 2025-05-15 DIAGNOSIS — J30.81 ALLERGIC RHINITIS DUE TO ANIMAL HAIR AND DANDER: Primary | ICD-10-CM

## 2025-05-15 PROCEDURE — 95117 IMMUNOTHERAPY INJECTIONS: CPT | Performed by: STUDENT IN AN ORGANIZED HEALTH CARE EDUCATION/TRAINING PROGRAM

## 2025-05-22 ENCOUNTER — CLINICAL SUPPORT (OUTPATIENT)
Dept: ENT CLINIC | Age: 60
End: 2025-05-22
Payer: COMMERCIAL

## 2025-05-22 DIAGNOSIS — J30.81 ALLERGIC RHINITIS DUE TO ANIMAL HAIR AND DANDER: Primary | ICD-10-CM

## 2025-05-22 DIAGNOSIS — M25.50 ARTHRALGIA OF MULTIPLE JOINTS: ICD-10-CM

## 2025-05-22 DIAGNOSIS — M54.50 CHRONIC LOW BACK PAIN, UNSPECIFIED BACK PAIN LATERALITY, UNSPECIFIED WHETHER SCIATICA PRESENT: ICD-10-CM

## 2025-05-22 DIAGNOSIS — G89.29 CHRONIC LOW BACK PAIN, UNSPECIFIED BACK PAIN LATERALITY, UNSPECIFIED WHETHER SCIATICA PRESENT: ICD-10-CM

## 2025-05-22 DIAGNOSIS — Z82.61 FAMILY HISTORY OF RHEUMATOID ARTHRITIS: ICD-10-CM

## 2025-05-22 PROCEDURE — 95117 IMMUNOTHERAPY INJECTIONS: CPT | Performed by: STUDENT IN AN ORGANIZED HEALTH CARE EDUCATION/TRAINING PROGRAM

## 2025-05-23 RX ORDER — CELECOXIB 200 MG/1
200 CAPSULE ORAL 2 TIMES DAILY
Qty: 60 CAPSULE | Refills: 3 | Status: SHIPPED | OUTPATIENT
Start: 2025-05-23

## 2025-05-27 RX ORDER — FLUTICASONE FUROATE, UMECLIDINIUM BROMIDE AND VILANTEROL TRIFENATATE 200; 62.5; 25 UG/1; UG/1; UG/1
POWDER RESPIRATORY (INHALATION)
Qty: 1 EACH | Refills: 2 | Status: SHIPPED | OUTPATIENT
Start: 2025-05-27

## 2025-05-29 ENCOUNTER — CLINICAL SUPPORT (OUTPATIENT)
Dept: ENT CLINIC | Age: 60
End: 2025-05-29
Payer: MEDICAID

## 2025-05-29 DIAGNOSIS — J30.81 ALLERGIC RHINITIS DUE TO ANIMAL HAIR AND DANDER: Primary | ICD-10-CM

## 2025-05-29 PROCEDURE — 95117 IMMUNOTHERAPY INJECTIONS: CPT | Performed by: STUDENT IN AN ORGANIZED HEALTH CARE EDUCATION/TRAINING PROGRAM

## 2025-06-05 ENCOUNTER — CLINICAL SUPPORT (OUTPATIENT)
Dept: ENT CLINIC | Age: 60
End: 2025-06-05
Payer: MEDICAID

## 2025-06-05 DIAGNOSIS — J30.81 ALLERGIC RHINITIS DUE TO ANIMAL HAIR AND DANDER: Primary | ICD-10-CM

## 2025-06-05 PROCEDURE — 95117 IMMUNOTHERAPY INJECTIONS: CPT | Performed by: STUDENT IN AN ORGANIZED HEALTH CARE EDUCATION/TRAINING PROGRAM

## 2025-06-06 ENCOUNTER — TELEPHONE (OUTPATIENT)
Dept: ENT CLINIC | Age: 60
End: 2025-06-06

## 2025-06-10 ENCOUNTER — NURSE ONLY (OUTPATIENT)
Dept: ENT CLINIC | Age: 60
End: 2025-06-10
Payer: COMMERCIAL

## 2025-06-10 DIAGNOSIS — J44.9 COPD, MILD (HCC): Primary | ICD-10-CM

## 2025-06-10 DIAGNOSIS — J30.9 ALLERGIC RHINITIS, UNSPECIFIED SEASONALITY, UNSPECIFIED TRIGGER: Primary | ICD-10-CM

## 2025-06-10 PROCEDURE — 95165 ANTIGEN THERAPY SERVICES: CPT | Performed by: STUDENT IN AN ORGANIZED HEALTH CARE EDUCATION/TRAINING PROGRAM

## 2025-06-10 RX ORDER — FLUTICASONE FUROATE, UMECLIDINIUM BROMIDE AND VILANTEROL TRIFENATATE 200; 62.5; 25 UG/1; UG/1; UG/1
1 POWDER RESPIRATORY (INHALATION) DAILY
Qty: 3 EACH | Refills: 0 | Status: SHIPPED | OUTPATIENT
Start: 2025-06-10 | End: 2025-09-08

## 2025-06-10 NOTE — PROGRESS NOTES
Total vials prepared: 2. First vial contains Pollens with 10 doses and second vial contains Mites, Molds, Epithelia, and Insects (M,M,Epi,I) with 6 doses.  Allergy extract was prepared according to written prescription by provider.  Provider onsite during allergy extract preparation.  Patient vials labeled with name, date of birth, vial number, Pollen or M M Epi I, and expiration date.  Injections are given weekly according to provider orders and injections are built according to patient tolerance to achieve a goal of maintenance.  See media scan for Prescription Treatment Set. See allergy flowsheets for injection documentation from each visit.

## 2025-06-13 ENCOUNTER — TELEPHONE (OUTPATIENT)
Dept: PULMONOLOGY | Age: 60
End: 2025-06-13

## 2025-06-13 DIAGNOSIS — J44.9 COPD, MILD (HCC): Primary | ICD-10-CM

## 2025-06-13 NOTE — TELEPHONE ENCOUNTER
Submitted PA for Chinedu Almonte 200-62.5-25MCG/ACT aerosol powder   Via CMM Key: [id number]  STATUS: PENDING.    Follow up done daily; if no decision with in three days we will refax.  If another three days goes by with no decision will call the insurance for status.

## 2025-06-17 NOTE — TELEPHONE ENCOUNTER
The medication was DENIED; DENIAL letter is uploaded to MED[employer].    Generic Denial: Auto response per portal. No letter generated. please see note below          Note :  Coverage is provided when the member meets the following requirements: Coverage is provided when the member has a history of at least 14 days of therapy with at least TWO preferred medication(s) used during the same time period (including at least 14 days with at least one preferred steroid-containing drug) which include but are not limited to: Advair Diskus (Brand name is preferred by the plan), Advair HFA (Brand name is preferred by the plan), Anoro Ellipta, Arnuity Ellipta, Asmanex Twisthaler, Atrovent HFA, Dulera, Pulmicort Flexhaler, Qvar, Serevent Diskus, Spiriva (Brand name is preferred by the plan), Stiolto and Symbicort (Brand name is preferred by the plan) . Other Reasons for denial may include : Documentation must be provided of medical necessity beyond convenience for why the member cannot be changed to the preferred drug(s). Please provide start and end dates of use for all preferred medications. The [facility] Policy for Medical Necessity as posted on the [facility] website and [facility] Preferred Drug List criteria were reviewed and per [place] Administrative Code Rule 5160-1-01 (C) and 5160-26-03 (B), a medically necessary service must include: generally accepted standards of medical practice, be clinically appropriate in administration, treatment and outcome and be the lowest cost alternative to effectively treat the condition. Please contact your provider to assist you with other treatment options that might be covered under your benefit package, or other services that might be available through the community.       If you want an APPEAL; please note in this encounter what new information you would like to APPEAL with.  Once complete route back to PA POOL.    If this requires a response please respond to the pool ( P MHCX PSC MEDICATION

## 2025-06-19 ENCOUNTER — CLINICAL SUPPORT (OUTPATIENT)
Dept: ENT CLINIC | Age: 60
End: 2025-06-19
Payer: MEDICAID

## 2025-06-19 DIAGNOSIS — J30.81 ALLERGIC RHINITIS DUE TO ANIMAL HAIR AND DANDER: Primary | ICD-10-CM

## 2025-06-19 PROCEDURE — 95117 IMMUNOTHERAPY INJECTIONS: CPT | Performed by: STUDENT IN AN ORGANIZED HEALTH CARE EDUCATION/TRAINING PROGRAM

## 2025-06-19 NOTE — TELEPHONE ENCOUNTER
Lisa Osman MD Webb, Arin J, MA12 hours ago (10:55 PM)     Anoro with Arunity will replace it     Soumya Bertrand MA Al Zoby, Muneer, MD2 days ago     AW  Telegy was denied    Preferred meds: Advair Diskus (Brand name is preferred by the plan), Advair HFA (Brand name is preferred by the plan), Anoro Ellipta, Arnuity Ellipta, Asmanex Twisthaler, Atrovent HFA, Dulera, Pulmicort Flexhaler, Qvar, Serevent Diskus, Spiriva (Brand name is preferred by the plan), Stiolto and Symbicort (Brand name is preferred by the plan)    Please advise.   I called and had to leave a VM

## 2025-06-20 DIAGNOSIS — J44.9 COPD, MILD (HCC): ICD-10-CM

## 2025-06-20 RX ORDER — UMECLIDINIUM BROMIDE AND VILANTEROL TRIFENATATE 62.5; 25 UG/1; UG/1
1 POWDER RESPIRATORY (INHALATION) DAILY
Qty: 1 EACH | Refills: 3 | Status: SHIPPED | OUTPATIENT
Start: 2025-06-20

## 2025-06-20 RX ORDER — ALBUTEROL SULFATE 90 UG/1
INHALANT RESPIRATORY (INHALATION)
Qty: 54 G | Refills: 1 | Status: SHIPPED | OUTPATIENT
Start: 2025-06-20

## 2025-06-26 ENCOUNTER — CLINICAL SUPPORT (OUTPATIENT)
Dept: ENT CLINIC | Age: 60
End: 2025-06-26
Payer: MEDICAID

## 2025-06-26 DIAGNOSIS — J30.81 ALLERGIC RHINITIS DUE TO ANIMAL HAIR AND DANDER: Primary | ICD-10-CM

## 2025-06-26 PROCEDURE — 95117 IMMUNOTHERAPY INJECTIONS: CPT | Performed by: STUDENT IN AN ORGANIZED HEALTH CARE EDUCATION/TRAINING PROGRAM

## 2025-07-01 ENCOUNTER — CLINICAL SUPPORT (OUTPATIENT)
Dept: ENT CLINIC | Age: 60
End: 2025-07-01
Payer: MEDICAID

## 2025-07-01 DIAGNOSIS — J30.81 ALLERGIC RHINITIS DUE TO ANIMAL HAIR AND DANDER: Primary | ICD-10-CM

## 2025-07-01 PROCEDURE — 95117 IMMUNOTHERAPY INJECTIONS: CPT | Performed by: OTOLARYNGOLOGY

## 2025-07-01 NOTE — PROGRESS NOTES
Correct serum vials verified by patient and nurse. After obtaining consent, and per orders of Dr sandoval, injections of allergy serum given by MICHAELA Felix. Patient instructed to remain in clinic for 30 minutes after injection, and to report any adverse reactions to me immediately. No change in patient status post injection.

## 2025-07-15 ENCOUNTER — CLINICAL SUPPORT (OUTPATIENT)
Dept: ENT CLINIC | Age: 60
End: 2025-07-15
Payer: MEDICAID

## 2025-07-15 DIAGNOSIS — J30.81 ALLERGIC RHINITIS DUE TO ANIMAL HAIR AND DANDER: Primary | ICD-10-CM

## 2025-07-15 PROCEDURE — 95117 IMMUNOTHERAPY INJECTIONS: CPT | Performed by: OTOLARYNGOLOGY

## 2025-07-22 ENCOUNTER — CLINICAL SUPPORT (OUTPATIENT)
Dept: ENT CLINIC | Age: 60
End: 2025-07-22
Payer: COMMERCIAL

## 2025-07-22 DIAGNOSIS — J30.81 ALLERGIC RHINITIS DUE TO ANIMAL HAIR AND DANDER: Primary | ICD-10-CM

## 2025-07-22 PROCEDURE — 95117 IMMUNOTHERAPY INJECTIONS: CPT | Performed by: OTOLARYNGOLOGY

## 2025-07-22 NOTE — PROGRESS NOTES
Correct serum vials verified by patient and nurse. Consent obtained and SVT completed.  SVT P=7mm wheal, M=7mm wheal.  After obtaining consent, and per orders of Dr Jason, injections of allergy serum given by MICHAELA Felix. Patient instructed to remain in clinic for 30 minutes after injection, and to report any adverse reactions to me immediately. No change in patient status post injection.

## 2025-07-29 ENCOUNTER — CLINICAL SUPPORT (OUTPATIENT)
Dept: ENT CLINIC | Age: 60
End: 2025-07-29
Payer: COMMERCIAL

## 2025-07-29 DIAGNOSIS — J30.81 ALLERGIC RHINITIS DUE TO ANIMAL HAIR AND DANDER: Primary | ICD-10-CM

## 2025-07-29 PROCEDURE — 95117 IMMUNOTHERAPY INJECTIONS: CPT | Performed by: OTOLARYNGOLOGY

## 2025-08-02 ENCOUNTER — HOSPITAL ENCOUNTER (OUTPATIENT)
Dept: CT IMAGING | Age: 60
Discharge: HOME | End: 2025-08-02
Payer: MEDICAID

## 2025-08-02 DIAGNOSIS — Z87.891 PERSONAL HISTORY OF TOBACCO USE: ICD-10-CM

## 2025-08-02 PROCEDURE — 71271 CT THORAX LUNG CANCER SCR C-: CPT

## 2025-08-05 ENCOUNTER — CLINICAL SUPPORT (OUTPATIENT)
Dept: ENT CLINIC | Age: 60
End: 2025-08-05
Payer: MEDICAID

## 2025-08-05 DIAGNOSIS — J30.81 ALLERGIC RHINITIS DUE TO ANIMAL HAIR AND DANDER: Primary | ICD-10-CM

## 2025-08-05 PROCEDURE — 95117 IMMUNOTHERAPY INJECTIONS: CPT | Performed by: OTOLARYNGOLOGY

## 2025-08-08 ENCOUNTER — TELEPHONE (OUTPATIENT)
Dept: ENT CLINIC | Age: 60
End: 2025-08-08

## 2025-08-12 ENCOUNTER — HOSPITAL ENCOUNTER (OUTPATIENT)
Dept: LAB | Age: 60
Discharge: HOME | End: 2025-08-12
Payer: MEDICAID

## 2025-08-12 ENCOUNTER — NURSE ONLY (OUTPATIENT)
Dept: ENT CLINIC | Age: 60
End: 2025-08-12
Payer: MEDICAID

## 2025-08-12 ENCOUNTER — OFFICE VISIT (OUTPATIENT)
Dept: PULMONOLOGY | Age: 60
End: 2025-08-12
Payer: MEDICAID

## 2025-08-12 VITALS
WEIGHT: 180 LBS | RESPIRATION RATE: 16 BRPM | HEIGHT: 65 IN | DIASTOLIC BLOOD PRESSURE: 74 MMHG | OXYGEN SATURATION: 98 % | HEART RATE: 96 BPM | SYSTOLIC BLOOD PRESSURE: 126 MMHG | BODY MASS INDEX: 29.99 KG/M2

## 2025-08-12 DIAGNOSIS — Z87.891 PERSONAL HISTORY OF TOBACCO USE: Primary | ICD-10-CM

## 2025-08-12 DIAGNOSIS — J44.9 COPD, MILD (HCC): ICD-10-CM

## 2025-08-12 DIAGNOSIS — J30.9 ALLERGIC RHINITIS, UNSPECIFIED SEASONALITY, UNSPECIFIED TRIGGER: Primary | ICD-10-CM

## 2025-08-12 DIAGNOSIS — J30.9 ALLERGIC RHINITIS, UNSPECIFIED SEASONALITY, UNSPECIFIED TRIGGER: ICD-10-CM

## 2025-08-12 DIAGNOSIS — J30.2 SEASONAL ALLERGIES: ICD-10-CM

## 2025-08-12 PROCEDURE — G8427 DOCREV CUR MEDS BY ELIG CLIN: HCPCS | Performed by: INTERNAL MEDICINE

## 2025-08-12 PROCEDURE — 95165 ANTIGEN THERAPY SERVICES: CPT | Performed by: STUDENT IN AN ORGANIZED HEALTH CARE EDUCATION/TRAINING PROGRAM

## 2025-08-12 PROCEDURE — 36415 COLL VENOUS BLD VENIPUNCTURE: CPT

## 2025-08-12 PROCEDURE — 3017F COLORECTAL CA SCREEN DOC REV: CPT | Performed by: INTERNAL MEDICINE

## 2025-08-12 PROCEDURE — G0296 VISIT TO DETERM LDCT ELIG: HCPCS | Performed by: INTERNAL MEDICINE

## 2025-08-12 PROCEDURE — 99214 OFFICE O/P EST MOD 30 MIN: CPT | Performed by: INTERNAL MEDICINE

## 2025-08-12 PROCEDURE — G8417 CALC BMI ABV UP PARAM F/U: HCPCS | Performed by: INTERNAL MEDICINE

## 2025-08-12 PROCEDURE — 3023F SPIROM DOC REV: CPT | Performed by: INTERNAL MEDICINE

## 2025-08-12 PROCEDURE — 1036F TOBACCO NON-USER: CPT | Performed by: INTERNAL MEDICINE

## 2025-08-12 PROCEDURE — 82103 ALPHA-1-ANTITRYPSIN TOTAL: CPT

## 2025-08-12 RX ORDER — FLUTICASONE FUROATE 50 UG/1
50 POWDER RESPIRATORY (INHALATION) DAILY
COMMUNITY

## 2025-08-14 LAB — A1AT SERPL-MCNC: 128 MG/DL (ref 90–200)

## 2025-08-19 ENCOUNTER — CLINICAL SUPPORT (OUTPATIENT)
Dept: ENT CLINIC | Age: 60
End: 2025-08-19
Payer: COMMERCIAL

## 2025-08-19 DIAGNOSIS — J30.81 ALLERGIC RHINITIS DUE TO ANIMAL HAIR AND DANDER: Primary | ICD-10-CM

## 2025-08-19 PROCEDURE — 95117 IMMUNOTHERAPY INJECTIONS: CPT | Performed by: OTOLARYNGOLOGY

## 2025-08-28 ENCOUNTER — TELEPHONE (OUTPATIENT)
Dept: PULMONOLOGY | Age: 60
End: 2025-08-28

## 2025-08-28 DIAGNOSIS — R06.02 SOB (SHORTNESS OF BREATH): ICD-10-CM

## 2025-08-28 DIAGNOSIS — R05.9 COUGH, UNSPECIFIED TYPE: Primary | ICD-10-CM

## 2025-08-28 RX ORDER — PREDNISONE 10 MG/1
TABLET ORAL
Qty: 30 TABLET | Refills: 0 | Status: SHIPPED | OUTPATIENT
Start: 2025-08-28 | End: 2025-09-09

## 2025-08-28 RX ORDER — PROPRANOLOL HYDROCHLORIDE 10 MG/1
10 TABLET ORAL
COMMUNITY
Start: 2025-05-16

## 2025-08-28 RX ORDER — PANTOPRAZOLE SODIUM 40 MG/1
TABLET, DELAYED RELEASE ORAL
COMMUNITY
Start: 2025-08-15

## 2025-08-28 RX ORDER — LANCETS 30 GAUGE
EACH MISCELLANEOUS
COMMUNITY
Start: 2025-07-22

## 2025-08-28 RX ORDER — CALCIUM CITRATE/VITAMIN D3 200MG-6.25
TABLET ORAL
COMMUNITY
Start: 2025-07-21

## 2025-08-28 RX ORDER — PREGABALIN 25 MG/1
CAPSULE ORAL
COMMUNITY
Start: 2025-08-26

## 2025-08-28 RX ORDER — METHYLPREDNISOLONE 4 MG/1
TABLET ORAL
COMMUNITY
Start: 2025-08-25

## 2025-08-28 RX ORDER — PSEUDOEPHEDRINE HCL 30 MG/1
TABLET, FILM COATED ORAL
COMMUNITY

## 2025-08-28 RX ORDER — AZITHROMYCIN 500 MG/1
500 TABLET, FILM COATED ORAL DAILY
Qty: 5 TABLET | Refills: 0 | Status: SHIPPED | OUTPATIENT
Start: 2025-08-28 | End: 2025-09-02

## 2025-09-02 ENCOUNTER — CLINICAL SUPPORT (OUTPATIENT)
Dept: ENT CLINIC | Age: 60
End: 2025-09-02
Payer: COMMERCIAL

## 2025-09-02 DIAGNOSIS — J30.81 ALLERGIC RHINITIS DUE TO ANIMAL HAIR AND DANDER: Primary | ICD-10-CM

## 2025-09-02 PROCEDURE — 95117 IMMUNOTHERAPY INJECTIONS: CPT | Performed by: OTOLARYNGOLOGY

## (undated) DEVICE — SYRINGE MED 5ML CANN 17GA BLU PLAS BLNT TIP LUERLOCK CONN

## (undated) DEVICE — PATIENT TRACKER 9734887XOM NON-INVASIVE

## (undated) DEVICE — MINOR SET UP PACK: Brand: MEDLINE INDUSTRIES, INC.

## (undated) DEVICE — TUBES STOMACH 48 IN X 16FR DBL LUMN SUMP SALEM ARGYLE ENFIT

## (undated) DEVICE — SUTURE ABSORBABLE MONOFILAMENT 4-0 SC-1 18 IN PLN GUT 1824H

## (undated) DEVICE — CONTAINER,SPECIMEN,OR STERILE,4OZ: Brand: MEDLINE

## (undated) DEVICE — Device: Brand: NAKAO QUICKTRAP

## (undated) DEVICE — TUBING, SUCTION, 3/16" X 12', STRAIGHT: Brand: MEDLINE

## (undated) DEVICE — KIT,ANTI FOG,W/SPONGE & FLUID,SOFT PACK: Brand: MEDLINE

## (undated) DEVICE — GLOVE,SURG,SENSICARE,ALOE,LF,PF,7: Brand: MEDLINE

## (undated) DEVICE — INSTRUMENT TRACKER 9733533XOM ENT 1PK

## (undated) DEVICE — TUBING 1895522 5PK STRAIGHTSHOT TO XPS: Brand: STRAIGHTSHOT®

## (undated) DEVICE — GLOVE,SURG,SENSICARE,ALOE,LF,PF,6: Brand: MEDLINE

## (undated) DEVICE — ELECTRODE PT RET AD L9FT HI MOIST COND ADH HYDRGEL CORDED

## (undated) DEVICE — SPLINT 1524055 DOYLE II AIRWAY SET: Brand: DOYLE II ™

## (undated) DEVICE — HEAD AND NECK PACK: Brand: CONVERTORS

## (undated) DEVICE — SOLUTION IRRIG 500ML 0.9% SOD CHLO USP POUR PLAS BTL

## (undated) DEVICE — NEEDLE HYPO 25GA L1.5IN BLU POLYPR HUB S STL REG BVL STR

## (undated) DEVICE — BLADE 1884080EM TRICUT 4MMX13CM M4 ROHS: Brand: FUSION®

## (undated) DEVICE — TUBE SUCT LAPSCP

## (undated) DEVICE — SYRINGE MED 10ML TRNSLUC BRL PLUNG BLK MRK POLYPR CTRL

## (undated) DEVICE — GLOVE SURG SZ 65 L12IN FNGR THK79MIL GRN LTX FREE

## (undated) DEVICE — CODMAN® SURGICAL PATTIES 1/2" X 3" (1.27CM X 7.62CM): Brand: CODMAN®

## (undated) DEVICE — SOLUTION IV 500ML 0.9% SOD CHL PH 5 INJ USP VIAFLX PLAS

## (undated) DEVICE — GAUZE,SPONGE,2"X2",8PLY,STERILE,LF,2'S: Brand: MEDLINE

## (undated) DEVICE — Z DISCONTINUED NO SUB SUTURE CHROMIC GUT SZ 4-0 L18IN ABSRB BRN L13MM P-3 3/8 CIR 1654G